# Patient Record
Sex: FEMALE | Race: WHITE
[De-identification: names, ages, dates, MRNs, and addresses within clinical notes are randomized per-mention and may not be internally consistent; named-entity substitution may affect disease eponyms.]

---

## 2017-08-29 ENCOUNTER — HOSPITAL ENCOUNTER (OUTPATIENT)
Dept: HOSPITAL 76 - DI | Age: 52
Discharge: HOME | End: 2017-08-29
Attending: PHYSICIAN ASSISTANT
Payer: COMMERCIAL

## 2017-08-29 DIAGNOSIS — Z12.31: Primary | ICD-10-CM

## 2017-08-29 DIAGNOSIS — R10.2: Primary | ICD-10-CM

## 2017-08-29 PROCEDURE — 76856 US EXAM PELVIC COMPLETE: CPT

## 2017-08-29 PROCEDURE — 76830 TRANSVAGINAL US NON-OB: CPT

## 2017-08-29 PROCEDURE — 77067 SCR MAMMO BI INCL CAD: CPT

## 2017-08-29 NOTE — ULTRASOUND REPORT
PELVIC ULTRASOUND:  08/29/2017 

 

CLINICAL INDICATION:  Right pain. 

 

TECHNIQUE:  Transabdominal pelvic ultrasound performed for global evaluation.  Transvaginal pelvic ul
trasound performed for detailed evaluation.  Real-time scanning performed and static images obtained.
 

 

The uterus is anteverted, measuring 7.5 x 5.3 x 3.3 cm.  The endometrial echo complex measures 3 mm. 
 No focal myometrial lesion is seen.  The right ovary was not confidently identified on transabdomina
l or transvaginal imaging.  No right adnexal mass is seen.  The left ovary measures 2.1 x 2.0 x 1.4 c
m, and is unremarkable.  No free fluid is present. 

 

IMPRESSION:  NONVISUALIZATION OF THE RIGHT OVARY.  NO ADNEXAL MASS.  NORMAL POSTMENOPAUSAL UTERUS AND
 LEFT OVARY. 

 

 

 

DD:08/29/2017 12:26:40  DT: 08/29/2017 17:09  JOB #: [id number]  EXT JOB #:[id number]

## 2017-08-30 NOTE — MAMMOGRAPHY REPORT
DIGITAL SCREENING MAMMOGRAM: 08/29/2017

 

CLINICAL INDICATION: A 52-year-old with family history of breast cancer, for screening. 

 

COMPARISON: 08/2014. 

 

TECHNIQUE:  Routine CC and MLO projections were obtained of the breasts.  

 

FINDINGS:  Parenchymal tissue within the breasts is predominantly fatty replaced. There are no domina
nt masses, suspicious microcalcifications, or secondary signs of malignancy. In comparison to the pre
vious studies, there are no significant changes.

 

IMPRESSION:  NO MAMMOGRAPHIC EVIDENCE OF MALIGNANCY. NO SIGNIFICANT INTERVAL CHANGES. 

 

RECOMMENDATION:  Screening mammography is recommended annually. 

 

BIRADS category 1 - negative.

 

STANDARD QUALIFYING STATEMENTS

1. This examination was reviewed with the aid of Computed-Aided Detection (CAD).

2. A negative or benign imaging report should not delay biopsy if clinically suspicious findings are 
present. Consider surgical consultation if warranted. More than 5% of cancers are not identified by i
maging.

3. Dense breasts may obscure an underlying neoplasm.

 

 

 

DD:08/30/2017 13:46:03  DT: 08/30/2017 14:32  JOB #: [id number]  EXT JOB #:[id number]

## 2018-07-08 ENCOUNTER — HOSPITAL ENCOUNTER (EMERGENCY)
Dept: HOSPITAL 76 - ED | Age: 53
Discharge: HOME | End: 2018-07-08
Payer: COMMERCIAL

## 2018-07-08 VITALS — DIASTOLIC BLOOD PRESSURE: 91 MMHG | SYSTOLIC BLOOD PRESSURE: 147 MMHG

## 2018-07-08 DIAGNOSIS — K04.7: Primary | ICD-10-CM

## 2018-07-08 DIAGNOSIS — I10: ICD-10-CM

## 2018-07-08 DIAGNOSIS — E78.00: ICD-10-CM

## 2018-07-08 PROCEDURE — 99283 EMERGENCY DEPT VISIT LOW MDM: CPT

## 2018-07-08 NOTE — ED PHYSICIAN DOCUMENTATION
PD HPI HEENT





- Stated complaint


Stated Complaint: TOOTH PAIN





- Chief complaint


Chief Complaint: Heent





- History obtained from


History obtained from: Patient





- History of Present Illness


Timing - onset: How many days ago (2)


Timing - duration: Days (2)


Timing - details: Gradual onset


Location: Tooth (right lower cap on tooth felt loose and started to hurt 

yesterday. Some swelling at base of gum today.)


Worsens: Swalllowing, Temperatures


Associated symptoms: Facial swelling (mild at gum area right lower.).  No: Fever

, Congestion, Rhinorrhea


Similar symptoms before: Has not had sx before


Recently seen: Not recently seen





Review of Systems


Constitutional: denies: Fever


Nose: denies: Rhinorrhea / runny nose, Congestion


Throat: reports: Dental pain / toothache.  denies: Oral lesions / sores, Sore 

throat, Swollen tonsils


Respiratory: denies: Cough


GI: denies: Nausea, Vomiting


Skin: denies: Rash





PD PAST MEDICAL HISTORY





- Past Medical History


Cardiovascular: Hypertension, High cholesterol


Respiratory: None


Endocrine/Autoimmune: None


GI: None


: None


HEENT: None


Psych: None


Musculoskeletal: None


Derm: None





- Past Surgical History


Past Surgical History: Yes


/GYN:  section, LEEP (Cervical surgery)


HEENT: Tonsil/Adenoidectomy





- Present Medications


Home Medications: 


 Ambulatory Orders











 Medication  Instructions  Recorded  Confirmed


 


Amlodipine Besylate [Norvasc] 5 mg PO DAILY 14


 


Lisinopril 20 mg PO DAILY 14


 


Cephalexin [Keflex] 500 mg PO TID #21 capsule 18 














- Allergies


Allergies/Adverse Reactions: 


 Allergies











Allergy/AdvReac Type Severity Reaction Status Date / Time


 


No Known Drug Allergies Allergy   Verified 18 12:48














- Social History


Does the pt smoke?: No


Smoking Status: Never smoker


Does the pt have substance abuse?: No





- POLST


Patient has POLST: No





PD ED PE NORMAL





- Vitals


Vital signs reviewed: Yes





- General


General: Alert and oriented X 3, No acute distress, Well developed/nourished





- HEENT


HEENT: Pharynx benign.  No: Dentition benign (right 2nd molar with cap and 

tenderness to percussion of it, and some swelling mildly of gum in that area. )





- Neck


Neck: Supple, no meningeal sign, No adenopathy





- Cardiac


Cardiac: RRR, No murmur





- Respiratory


Respiratory: Clear bilaterally





- Derm


Derm: Normal color, Warm and dry, No rash





Results





- Vitals


Vitals: 


 Oxygen











O2 Source                      Room air

















PD MEDICAL DECISION MAKING





- ED course


Complexity details: considered differential, d/w patient





- Sepsis Event


Vital Signs: 


 Oxygen











O2 Source                      Room air

















Departure





- Departure


Disposition: 01 Home, Self Care


Clinical Impression: 


 Dental infection





Condition: Stable


Record reviewed to determine appropriate education?: Yes


Instructions:  ED Abscess Dental


Follow-Up: 


Celia Mccarty MD [Primary Care Provider] - 


Prescriptions: 


Cephalexin [Keflex] 500 mg PO TID #21 capsule


Comments: 


Drink lots of fluids.  Anti-inflammatories such as ibuprofen or naproxen 2-3 

times a day for the next few days.  Cephalexin 3 times a day for the next week 

for the infection.  Add Tylenol if needed for pain.  Follow-up with dentist at 

soonest availability.


Discharge Date/Time: 18 14:44

## 2021-04-24 ENCOUNTER — HOSPITAL ENCOUNTER (OUTPATIENT)
Dept: HOSPITAL 76 - LAB | Age: 56
Discharge: HOME | End: 2021-04-24
Attending: NURSE PRACTITIONER
Payer: COMMERCIAL

## 2021-04-24 DIAGNOSIS — I10: Primary | ICD-10-CM

## 2021-04-24 DIAGNOSIS — E78.5: ICD-10-CM

## 2021-04-24 LAB
ALBUMIN DIAFP-MCNC: 4.4 G/DL (ref 3.2–5.5)
ALBUMIN/GLOB SERPL: 1.5 {RATIO} (ref 1–2.2)
ALP SERPL-CCNC: 48 IU/L (ref 42–121)
ALT SERPL W P-5'-P-CCNC: 16 IU/L (ref 10–60)
ANION GAP SERPL CALCULATED.4IONS-SCNC: 10 MMOL/L (ref 6–13)
AST SERPL W P-5'-P-CCNC: 18 IU/L (ref 10–42)
BASOPHILS NFR BLD AUTO: 0.1 10^3/UL (ref 0–0.1)
BASOPHILS NFR BLD AUTO: 0.7 %
BILIRUB BLD-MCNC: 0.6 MG/DL (ref 0.2–1)
BUN SERPL-MCNC: 17 MG/DL (ref 6–20)
CALCIUM UR-MCNC: 9.6 MG/DL (ref 8.5–10.3)
CHLORIDE SERPL-SCNC: 107 MMOL/L (ref 101–111)
CHOLEST SERPL-MCNC: 180 MG/DL
CO2 SERPL-SCNC: 25 MMOL/L (ref 21–32)
CREAT SERPLBLD-SCNC: 0.7 MG/DL (ref 0.4–1)
EOSINOPHIL # BLD AUTO: 0.5 10^3/UL (ref 0–0.7)
EOSINOPHIL NFR BLD AUTO: 5.1 %
ERYTHROCYTE [DISTWIDTH] IN BLOOD BY AUTOMATED COUNT: 14.1 % (ref 12–15)
GFRSERPLBLD MDRD-ARVRAT: 87 ML/MIN/{1.73_M2} (ref 89–?)
GLOBULIN SER-MCNC: 3 G/DL (ref 2.1–4.2)
GLUCOSE SERPL-MCNC: 118 MG/DL (ref 70–100)
HCT VFR BLD AUTO: 41.9 % (ref 37–47)
HDLC SERPL-MCNC: 41 MG/DL
HDLC SERPL: 4.4 {RATIO} (ref ?–4.4)
HGB UR QL STRIP: 13.6 G/DL (ref 12–16)
LDLC SERPL CALC-MCNC: 107 MG/DL
LDLC/HDLC SERPL: 2.6 {RATIO} (ref ?–4.4)
LYMPHOCYTES # SPEC AUTO: 3 10^3/UL (ref 1.5–3.5)
LYMPHOCYTES NFR BLD AUTO: 32.8 %
MCH RBC QN AUTO: 28.5 PG (ref 27–31)
MCHC RBC AUTO-ENTMCNC: 32.5 G/DL (ref 32–36)
MCV RBC AUTO: 87.8 FL (ref 81–99)
MONOCYTES # BLD AUTO: 0.6 10^3/UL (ref 0–1)
MONOCYTES NFR BLD AUTO: 6.2 %
NEUTROPHILS # BLD AUTO: 5.1 10^3/UL (ref 1.5–6.6)
NEUTROPHILS # SNV AUTO: 9.2 X10^3/UL (ref 4.8–10.8)
NEUTROPHILS NFR BLD AUTO: 54.9 %
NRBC # BLD AUTO: 0 /100WBC
NRBC # BLD AUTO: 0 X10^3/UL
PDW BLD AUTO: 10.3 FL (ref 7.9–10.8)
PLATELET # BLD: 366 10^3/UL (ref 130–450)
POTASSIUM SERPL-SCNC: 3.9 MMOL/L (ref 3.5–5)
PROT SPEC-MCNC: 7.4 G/DL (ref 6.7–8.2)
RBC MAR: 4.77 10^6/UL (ref 4.2–5.4)
SODIUM SERPLBLD-SCNC: 142 MMOL/L (ref 135–145)
TRIGL P FAST SERPL-MCNC: 161 MG/DL
TSH SERPL-ACNC: 2.25 UIU/ML (ref 0.34–5.6)
VLDLC SERPL-SCNC: 32 MG/DL

## 2021-04-24 PROCEDURE — 83721 ASSAY OF BLOOD LIPOPROTEIN: CPT

## 2021-04-24 PROCEDURE — 80061 LIPID PANEL: CPT

## 2021-04-24 PROCEDURE — 84443 ASSAY THYROID STIM HORMONE: CPT

## 2021-04-24 PROCEDURE — 80053 COMPREHEN METABOLIC PANEL: CPT

## 2021-04-24 PROCEDURE — 85025 COMPLETE CBC W/AUTO DIFF WBC: CPT

## 2021-04-24 PROCEDURE — 36415 COLL VENOUS BLD VENIPUNCTURE: CPT

## 2021-10-10 ENCOUNTER — HOSPITAL ENCOUNTER (EMERGENCY)
Dept: HOSPITAL 76 - ED | Age: 56
Discharge: ANOTHER HOSPITAL | End: 2021-10-10
Payer: COMMERCIAL

## 2021-10-10 VITALS — SYSTOLIC BLOOD PRESSURE: 127 MMHG | DIASTOLIC BLOOD PRESSURE: 85 MMHG

## 2021-10-10 DIAGNOSIS — I10: ICD-10-CM

## 2021-10-10 DIAGNOSIS — R11.0: ICD-10-CM

## 2021-10-10 DIAGNOSIS — Z20.822: ICD-10-CM

## 2021-10-10 DIAGNOSIS — I21.4: Primary | ICD-10-CM

## 2021-10-10 LAB
ALBUMIN DIAFP-MCNC: 4.6 G/DL (ref 3.2–5.5)
ALBUMIN/GLOB SERPL: 1.4 {RATIO} (ref 1–2.2)
ALP SERPL-CCNC: 52 IU/L (ref 42–121)
ALT SERPL W P-5'-P-CCNC: 37 IU/L (ref 10–60)
ANION GAP SERPL CALCULATED.4IONS-SCNC: 12 MMOL/L (ref 6–13)
AST SERPL W P-5'-P-CCNC: 17 IU/L (ref 10–42)
B PARAPERT DNA SPEC QL NAA+PROBE: NOT DETECTED
B PERT DNA SPEC QL NAA+PROBE: NOT DETECTED
BASOPHILS NFR BLD AUTO: 0.1 10^3/UL (ref 0–0.1)
BASOPHILS NFR BLD AUTO: 0.7 %
BILIRUB BLD-MCNC: 0.6 MG/DL (ref 0.2–1)
BUN SERPL-MCNC: 18 MG/DL (ref 6–20)
C PNEUM DNA NPH QL NAA+NON-PROBE: NOT DETECTED
CALCIUM UR-MCNC: 9.5 MG/DL (ref 8.5–10.3)
CHLORIDE SERPL-SCNC: 105 MMOL/L (ref 101–111)
CO2 SERPL-SCNC: 26 MMOL/L (ref 21–32)
CREAT SERPLBLD-SCNC: 1 MG/DL (ref 0.4–1)
EOSINOPHIL # BLD AUTO: 0.5 10^3/UL (ref 0–0.7)
EOSINOPHIL NFR BLD AUTO: 4.7 %
ERYTHROCYTE [DISTWIDTH] IN BLOOD BY AUTOMATED COUNT: 14.4 % (ref 12–15)
FLUAV RNA RESP QL NAA+PROBE: NOT DETECTED
GFRSERPLBLD MDRD-ARVRAT: 57 ML/MIN/{1.73_M2} (ref 89–?)
GLOBULIN SER-MCNC: 3.2 G/DL (ref 2.1–4.2)
GLUCOSE SERPL-MCNC: 147 MG/DL (ref 70–100)
HAEM INFLU B DNA SPEC QL NAA+PROBE: NOT DETECTED
HCOV 229E RNA SPEC QL NAA+PROBE: NOT DETECTED
HCOV HKU1 RNA UPPER RESP QL NAA+PROBE: NOT DETECTED
HCOV NL63 RNA ASPIRATE QL NAA+PROBE: NOT DETECTED
HCOV OC43 RNA SPEC QL NAA+PROBE: NOT DETECTED
HCT VFR BLD AUTO: 44 % (ref 37–47)
HGB UR QL STRIP: 13.7 G/DL (ref 12–16)
HMPV AG SPEC QL: NOT DETECTED
HPIV1 RNA NPH QL NAA+PROBE: NOT DETECTED
HPIV2 SPEC QL CULT: NOT DETECTED
HPIV3 AB TITR SER CF: NOT DETECTED {TITER}
HPIV4 RNA SPEC QL NAA+PROBE: NOT DETECTED
LIPASE SERPL-CCNC: 40 U/L (ref 22–51)
LYMPHOCYTES # SPEC AUTO: 3.4 10^3/UL (ref 1.5–3.5)
LYMPHOCYTES NFR BLD AUTO: 33.7 %
M PNEUMO DNA SPEC QL NAA+PROBE: NOT DETECTED
MCH RBC QN AUTO: 28.3 PG (ref 27–31)
MCHC RBC AUTO-ENTMCNC: 31.1 G/DL (ref 32–36)
MCV RBC AUTO: 90.9 FL (ref 81–99)
MONOCYTES # BLD AUTO: 0.6 10^3/UL (ref 0–1)
MONOCYTES NFR BLD AUTO: 6.2 %
NEUTROPHILS # BLD AUTO: 5.5 10^3/UL (ref 1.5–6.6)
NEUTROPHILS # SNV AUTO: 10.1 X10^3/UL (ref 4.8–10.8)
NEUTROPHILS NFR BLD AUTO: 54.5 %
NRBC # BLD AUTO: 0 /100WBC
NRBC # BLD AUTO: 0 X10^3/UL
PDW BLD AUTO: 10.7 FL (ref 7.9–10.8)
PLATELET # BLD: 250 10^3/UL (ref 130–450)
POTASSIUM SERPL-SCNC: 3.4 MMOL/L (ref 3.5–5)
PROT SPEC-MCNC: 7.8 G/DL (ref 6.7–8.2)
RBC MAR: 4.84 10^6/UL (ref 4.2–5.4)
RSV RNA RESP QL NAA+PROBE: NOT DETECTED
RV+EV RNA SPEC QL NAA+PROBE: NOT DETECTED
SARS-COV-2 RNA PNL SPEC NAA+PROBE: NOT DETECTED
SODIUM SERPLBLD-SCNC: 143 MMOL/L (ref 135–145)

## 2021-10-10 PROCEDURE — 80053 COMPREHEN METABOLIC PANEL: CPT

## 2021-10-10 PROCEDURE — 96365 THER/PROPH/DIAG IV INF INIT: CPT

## 2021-10-10 PROCEDURE — 36415 COLL VENOUS BLD VENIPUNCTURE: CPT

## 2021-10-10 PROCEDURE — 71045 X-RAY EXAM CHEST 1 VIEW: CPT

## 2021-10-10 PROCEDURE — 0202U NFCT DS 22 TRGT SARS-COV-2: CPT

## 2021-10-10 PROCEDURE — 83690 ASSAY OF LIPASE: CPT

## 2021-10-10 PROCEDURE — 99291 CRITICAL CARE FIRST HOUR: CPT

## 2021-10-10 PROCEDURE — 71275 CT ANGIOGRAPHY CHEST: CPT

## 2021-10-10 PROCEDURE — 93005 ELECTROCARDIOGRAM TRACING: CPT

## 2021-10-10 PROCEDURE — 84484 ASSAY OF TROPONIN QUANT: CPT

## 2021-10-10 PROCEDURE — 96375 TX/PRO/DX INJ NEW DRUG ADDON: CPT

## 2021-10-10 PROCEDURE — 85025 COMPLETE CBC W/AUTO DIFF WBC: CPT

## 2021-10-10 RX ADMIN — NITROGLYCERIN STA MG: 0.4 TABLET, ORALLY DISINTEGRATING SUBLINGUAL at 08:52

## 2021-10-10 RX ADMIN — NITROGLYCERIN STA MG: 0.4 TABLET, ORALLY DISINTEGRATING SUBLINGUAL at 09:01

## 2021-10-10 RX ADMIN — NITROGLYCERIN STA MG: 0.4 TABLET, ORALLY DISINTEGRATING SUBLINGUAL at 09:11

## 2021-10-10 RX ADMIN — NITROGLYCERIN STA MG: 0.4 TABLET, ORALLY DISINTEGRATING SUBLINGUAL at 14:21

## 2021-10-10 NOTE — XRAY REPORT
PROCEDURE:  Chest 1 View X-Ray

 

INDICATIONS:  Chest Pain

 

TECHNIQUE:  One view of the chest was acquired.  

 

COMPARISON:  7/6/2015

 

FINDINGS:  

 

Surgical changes and devices:  None.  

 

Lungs and pleura:  An incomplete inspiratory result is noted, with low lung volumes and crowding of t
he vascular markings. No focal infiltrates are seen. No large pneumothorax or large pleural effusion 
can be seen.

 

Mediastinum:  Mediastinal contours appear normal.  Heart size is normal.  

 

Bones and chest wall:  No suspicious bony lesions.  Overlying soft tissues appear unremarkable.  

 

 

 

IMPRESSION:  

 

Portable chest within normal limits for age.  

 

Reviewed by: Christian Bell MD on 10/10/2021 8:11 AM LYNNE

Approved by: Christian Bell MD on 10/10/2021 8:11 AM LYNNE

 

 

Station ID:  [id number]

## 2021-10-10 NOTE — CT REPORT
PROCEDURE:  ANGIO CHEST W/WO

 

INDICATIONS:  abrupt chest/back pain 30 min PTA

 

CONTRAST:  IV CONTRAST: Optiray 320 ml: 80 PO CONTRAST: *NO PO CONTRAST 

 

TECHNIQUE:  

After the administration of intravenous contrast, 2 mm axial images were acquired from the pulmonary 
apices to the posterior costophrenic angles during the arterial phase. In addition, 1 mm lung kernel 
and 5 mm soft tissue kernel reconstructions were performed. 3-dimensional coronal oblique maximum int
ensity projection (MIP) reformats, 8 mm axial MIP, and 5 mm coronal and sagittal MPR reformats were t
hen performed through the thorax. For radiation dose reduction, the following was used: automated exp
osure control, adjustment of mA and/or kV according to patient size.

 

 

COMPARISON:  Chest x-ray  10/10/2021

 

FINDINGS:  

Image quality:  Excellent.  

 

Pulmonary arteries:  Pulmonary arteries are normal in size, and demonstrate no intraluminal filling d
efects to suggest central pulmonary embolism.  

 

Lungs and pleura:  Lungs are clear.  No pleural effusions or pneumothorax.  Central and peripheral ai
rways are patent.  

dated

Mediastinum:  Heart size is normal, without pericardial effusion.  No mediastinal or hilar adenopathy
.  Thoracic aorta is normal in caliber and enhancement.  Esophagus is normal in caliber, without hiat
al hernia.  

 

Bones and chest wall:  No suspicious bony lesions.  Ribs and thoracic spine appear intact throughout.
  No axillary or supraclavicular adenopathy.  The thyroid is normal in size and there are no incident
al findings.

 

Abdomen:  Visualized upper abdominal solid organs appear normal in the early arterial phase of enhanc
ement.  

 

IMPRESSION:  

 

1. No acute process. 

2. No pulmonary embolus.

 

Reviewed by: Yessica Gotti MD on 10/10/2021 10:50 AM PDT

Approved by: Yessica Gotti MD on 10/10/2021 10:50 AM PDT

 

 

Station ID:  [id number]

## 2021-10-10 NOTE — ED PHYSICIAN DOCUMENTATION
PD HPI CHEST PAIN





- Stated complaint


Stated Complaint: UPPER BACK PX





- Chief complaint


Chief Complaint: Back Pain





- History obtained from


History obtained from: Patient





- History of Present Illness


Timing - onset: How many minutes ago (30), Today


Timing - onset during: Light activity (was just up into bathroom, with normal 

light morning activity, and had abrupt onset of upper thoracic sharp pain 

radiating to chest, associated with nausea, sweaty, lightheaded.)


Timing - details: Abrupt onset, Still present


Quality: Aching, Sharp, Pain


Location: Substernal, Upper back


Radiation: Back


Improved by: No: Rest


Worsened by: No: Inspiration, Movement, Position


Associated symptoms: Nausea, Feeling faint / dizzy, General Weakness.  No: 

Shortness of air, Vomiting, Cough


Similar symptoms before: Has not had sx before


Recently seen: Not recently seen





Review of Systems


Constitutional: denies: Fever, Chills, Myalgias


Nose: denies: Rhinorrhea / runny nose, Congestion


Throat: denies: Sore throat


Cardiac: reports: Chest pain / pressure (just PTA).  denies: Pedal edema, Calf 

pain


Respiratory: denies: Cough


GI: reports: Nausea.  denies: Abdominal Pain, Vomiting, Diarrhea


Skin: denies: Rash, Lesions


Neurologic: reports: Generalized weakness.  denies: Near syncope, Altered mental

status, Headache





PD PAST MEDICAL HISTORY





- Past Medical History


Cardiovascular: Hypertension, High cholesterol


Respiratory: None


Endocrine/Autoimmune: None


GI: None


: None


HEENT: None


Psych: None


Musculoskeletal: None


Derm: None





- Past Surgical History


Past Surgical History: Yes


/GYN:  section, LEEP (Cervical surgery)


HEENT: Tonsil/Adenoidectomy





- Present Medications


Home Medications: 


                                Ambulatory Orders











 Medication  Instructions  Recorded  Confirmed


 


Amlodipine Besylate [Norvasc] 5 mg PO DAILY 12/30/14 10/10/21


 


lisinopriL [Lisinopril] 20 mg PO DAILY 12/30/14 10/10/21


 


Potassium Chloride [Klor-Con] 20 meq PO DAILY 10/10/21 10/10/21














- Allergies


Allergies/Adverse Reactions: 


                                    Allergies











Allergy/AdvReac Type Severity Reaction Status Date / Time


 


No Known Drug Allergies Allergy   Verified 10/10/21 08:28














- Social History


Does the pt smoke?: No


Smoking Status: Never smoker


Does the pt have substance abuse?: No





- POLST


Patient has POLST: No





PD ED PE NORMAL





- Vitals


Vital signs reviewed: Yes





- General


General: Alert and oriented X 3, Well developed/nourished, Other (diaphoretic 

and pale on presentation.)





- HEENT


HEENT: Pharynx benign





- Neck


Neck: Supple, no meningeal sign, No adenopathy, No JVD





- Cardiac


Cardiac: RRR, No murmur





- Respiratory


Respiratory: Clear bilaterally





- Abdomen


Abdomen: Soft, Non tender





- Back


Back: No CVA TTP, No spinal TTP





- Derm


Derm: No: Normal color, Warm and dry





- Extremities


Extremities: No tenderness to palpate, Normal ROM s pain, No edema, No calf 

tenderness / cord





- Neuro


Neuro: Alert and oriented X 3, No motor deficit, Normal speech


Eye Opening: Spontaneous


Motor: Obeys Commands


Verbal: Oriented


GCS Score: 15





- Psych


Psych: Normal mood, Normal affect





Results





- Vitals


Vitals: 


                               Vital Signs - 24 hr











  10/10/21 10/10/21 10/10/21





  08:29 08:57 09:00


 


Temperature 36.5 C  


 


Heart Rate 60 66 64


 


Respiratory 24 18 14





Rate   


 


Blood Pressure 149/104 H 160/100 H 133/105 H


 


O2 Saturation 99 95 98














  10/10/21 10/10/21 10/10/21





  09:02 09:08 09:12


 


Temperature   


 


Heart Rate 64 70 67


 


Respiratory 12 16 12





Rate   


 


Blood Pressure 125/100 H 136/81 H 138/81 H


 


O2 Saturation 96 94 98














  10/10/21 10/10/21 10/10/21





  09:17 09:30 10:05


 


Temperature   


 


Heart Rate 66 59 L 56 L


 


Respiratory 11 L 15 15





Rate   


 


Blood Pressure 138/70 H 127/85 H 135/82 H


 


O2 Saturation 98 100 100














  10/10/21 10/10/21 10/10/21





  10:46 11:25 11:30


 


Temperature   


 


Heart Rate 65 55 L 76


 


Respiratory 14 14 20





Rate   


 


Blood Pressure 133/68 H 129/76 134/66 H


 


O2 Saturation 99 100 97














  10/10/21 10/10/21 10/10/21





  12:13 12:59 13:03


 


Temperature   


 


Heart Rate 69 54 L 56 L


 


Respiratory 16 16 16





Rate   


 


Blood Pressure 149/76 H 143/80 H 141/75 H


 


O2 Saturation 100 99 99














  10/10/21 10/10/21 10/10/21





  13:49 14:21 14:27


 


Temperature 37.1 C  


 


Heart Rate 61 58 L 69


 


Respiratory 14 14 15





Rate   


 


Blood Pressure 154/98 H 138/88 H 135/75 H


 


O2 Saturation 100 100 99














  10/10/21 10/10/21 10/10/21





  14:46 15:16 15:32


 


Temperature   


 


Heart Rate 68 72 62


 


Respiratory 18 16 13





Rate   


 


Blood Pressure 114/70 157/91 H 131/80 H


 


O2 Saturation 98 96 97














  10/10/21 10/10/21 10/10/21





  15:39 15:42 15:47


 


Temperature   


 


Heart Rate 58 L 58 L 55 L


 


Respiratory 14  





Rate   


 


Blood Pressure 136/97 H 134/85 H 131/80 H


 


O2 Saturation 95 97 














  10/10/21 10/10/21 10/10/21





  15:50 15:52 15:55


 


Temperature   


 


Heart Rate 62  59 L


 


Respiratory   





Rate   


 


Blood Pressure  121/83 H 132/78 H


 


O2 Saturation   95














  10/10/21





  16:00


 


Temperature 


 


Heart Rate 60


 


Respiratory 





Rate 


 


Blood Pressure 127/85 H


 


O2 Saturation 98








                                     Oxygen











O2 Source                      Nasal cannula

















- EKG (time done)


  ** 08:33


Rate: Rate (enter#) (66)


Rhythm: NSR


Axis: Normal


Intervals: Normal RI


QRS: Normal


Ischemia: Normal ST segments.  No: ST elevation c/w ischemia (nondiagnostic hint

of elevation/prominent T waves I, lateral. ), ST depression





- Labs


Labs: 


                                Laboratory Tests











  10/10/21 10/10/21 10/10/21





  09:01 09:01 09:01


 


WBC  10.1  


 


RBC  4.84  


 


Hgb  13.7  


 


Hct  44.0  


 


MCV  90.9  


 


MCH  28.3  


 


MCHC  31.1 L  


 


RDW  14.4  


 


Plt Count  250  


 


MPV  10.7  


 


Neut # (Auto)  5.5  


 


Lymph # (Auto)  3.4  


 


Mono # (Auto)  0.6  


 


Eos # (Auto)  0.5  


 


Baso # (Auto)  0.1  


 


Absolute Nucleated RBC  0.00  


 


Nucleated RBC %  0.0  


 


Sodium   143 


 


Potassium   3.4 L 


 


Chloride   105 


 


Carbon Dioxide   26 


 


Anion Gap   12.0 


 


BUN   18 


 


Creatinine   1.0 


 


Estimated GFR (MDRD)   57 L 


 


Glucose   147 H 


 


Calcium   9.5 


 


Total Bilirubin   0.6 


 


AST   17 


 


ALT   37 


 


Alkaline Phosphatase   52 


 


Troponin I High Sens    23.6 H*


 


Total Protein   7.8 


 


Albumin   4.6 


 


Globulin   3.2 


 


Albumin/Globulin Ratio   1.4 


 


Lipase   40 


 


Nasal Adenovirus (PCR)   


 


Nasal B. parapertussis DNA (PCR)   


 


Nasal Coronavir 229E PCR   


 


Nasal Coronavir HKU1 PCR   


 


Nasal Coronavir NL63 PCR   


 


Nasal Coronavir OC43 PCR   


 


Nasal Enterovir/Rhinovir PCR   


 


Nasal Influenza B PCR   


 


Nasal Influenza A PCR   


 


Nasal Parainfluen 1 PCR   


 


Nasal Parainfluen 2 PCR   


 


Nasal Parainfluen 3 PCR   


 


Nasal Parainfluen 4 PCR   


 


Nasal RSV (PCR)   


 


Nasal B.pertussis DNA PCR   


 


Nasal C.pneumoniae (PCR)   


 


Hernan Human Metapneumo PCR   


 


Nasal M.pneumoniae (PCR)   


 


Nasal SARS-CoV-2 (PCR)   














  10/10/21 10/10/21 10/10/21





  11:03 13:38 14:57


 


WBC   


 


RBC   


 


Hgb   


 


Hct   


 


MCV   


 


MCH   


 


MCHC   


 


RDW   


 


Plt Count   


 


MPV   


 


Neut # (Auto)   


 


Lymph # (Auto)   


 


Mono # (Auto)   


 


Eos # (Auto)   


 


Baso # (Auto)   


 


Absolute Nucleated RBC   


 


Nucleated RBC %   


 


Sodium   


 


Potassium   


 


Chloride   


 


Carbon Dioxide   


 


Anion Gap   


 


BUN   


 


Creatinine   


 


Estimated GFR (MDRD)   


 


Glucose   


 


Calcium   


 


Total Bilirubin   


 


AST   


 


ALT   


 


Alkaline Phosphatase   


 


Troponin I High Sens  631.2 H*   1823.1 H*


 


Total Protein   


 


Albumin   


 


Globulin   


 


Albumin/Globulin Ratio   


 


Lipase   


 


Nasal Adenovirus (PCR)   NOT DETECTED 


 


Nasal B. parapertussis DNA (PCR)   NOT DETECTED 


 


Nasal Coronavir 229E PCR   NOT DETECTED 


 


Nasal Coronavir HKU1 PCR   NOT DETECTED 


 


Nasal Coronavir NL63 PCR   NOT DETECTED 


 


Nasal Coronavir OC43 PCR   NOT DETECTED 


 


Nasal Enterovir/Rhinovir PCR   NOT DETECTED 


 


Nasal Influenza B PCR   NOT DETECTED 


 


Nasal Influenza A PCR   NOT DETECTED 


 


Nasal Parainfluen 1 PCR   NOT DETECTED 


 


Nasal Parainfluen 2 PCR   NOT DETECTED 


 


Nasal Parainfluen 3 PCR   NOT DETECTED 


 


Nasal Parainfluen 4 PCR   NOT DETECTED 


 


Nasal RSV (PCR)   NOT DETECTED 


 


Nasal B.pertussis DNA PCR   NOT DETECTED 


 


Nasal C.pneumoniae (PCR)   NOT DETECTED 


 


Hernan Human Metapneumo PCR   NOT DETECTED 


 


Nasal M.pneumoniae (PCR)   NOT DETECTED 


 


Nasal SARS-CoV-2 (PCR)   NOT DETECTED 














- Rads (name of study)


  ** chest xray


Radiology: Prelim report reviewed (no acute process), See rad report





  ** chest angio


Radiology: Prelim report reviewed (no PE nor aortic process; lungs clear), See 

rad report





PD MEDICAL DECISION MAKING





- ED course


Complexity details: reviewed results (repeat Trop showing significant increase 

c/w NSTEMI. ), re-evaluated patient (she is improved well with NTG x 3 and 

Morphine. Color improved. ), considered differential (Abrupt onset of 

significant pain in the back into the chest associated with nausea diaphoresis 

and pallor.  Clinically would be very suspicious suspiciously concerning for 

acute process such as MI, clots, dissection, lung process.), d/w patient


ED course: 





The patient's color is much improved and her symptoms have decreased 

significantly with nitro x3 and morphine.  Repeat EKG shows some T wave 

flattening but no acute ischemic changes per se.  CT of the chest was done for 

concern of aortic or vascular process and this was normal.  Repeat troponin at 2

hours showed a significant increase.  Her symptoms and testing are indicative of

non-ST elevation MI.  I feel she needs more appropriate evaluation and testing 

with cardiology and will look at transfer to a different facility.





At this point we are looking into beds available at other facilities and 

unfortunately taking a little bit of a protracted time for space available.  

Calling various hospitals.








The patient has had recurrent chest pains while here in the ER.  Some was 

improved with nitroglycerin again and morphine.  However she had another sub

sequent episode with diaphoresis.  She was placed on a nitro drip with titrating

upward.  Also IV morphine and ondansetron for nausea.  Concern is for her 

ongoing and recurring symptoms.  We worse finally able to find a facility that 

was able to accept her in transfer.  I talked with Dr. Nogueira cardiology at 

[facility] in [place].  He was willing to accept the patient in transfer ER

to ER for subsequent evaluation and presumed heart catheterization.  The patient

will be transferred via air flight as she is on heparin and nitroglycerin drips 

with still some symptoms and requires higher level of care and expeditious 

transfer.





- Critical Care


Time(min): 50


Time Includes: Direct patient care, Reassess patient, Document care, Coordinate 

care


Data interpretation: Labs, CXR


Procedures excluded from critical care time: EKG





Departure





- Departure


Disposition: 02 Transfer Acute Care Hosp


Clinical Impression: 


 Acute chest pain, NSTEMI (non-ST elevated myocardial infarction)





Condition: Stable


Record reviewed to determine appropriate education?: Yes

## 2021-10-15 ENCOUNTER — HOSPITAL ENCOUNTER (OUTPATIENT)
Dept: HOSPITAL 76 - ED | Age: 56
Setting detail: OBSERVATION
LOS: 1 days | Discharge: HOME | End: 2021-10-16
Attending: SPECIALIST | Admitting: NURSE PRACTITIONER
Payer: COMMERCIAL

## 2021-10-15 DIAGNOSIS — E78.5: ICD-10-CM

## 2021-10-15 DIAGNOSIS — R77.8: ICD-10-CM

## 2021-10-15 DIAGNOSIS — E87.6: ICD-10-CM

## 2021-10-15 DIAGNOSIS — I50.23: ICD-10-CM

## 2021-10-15 DIAGNOSIS — Z95.5: ICD-10-CM

## 2021-10-15 DIAGNOSIS — I25.5: ICD-10-CM

## 2021-10-15 DIAGNOSIS — Z20.822: ICD-10-CM

## 2021-10-15 DIAGNOSIS — I11.0: Primary | ICD-10-CM

## 2021-10-15 DIAGNOSIS — F41.9: ICD-10-CM

## 2021-10-15 LAB
ALBUMIN DIAFP-MCNC: 3.3 G/DL (ref 3.2–5.5)
ALBUMIN/GLOB SERPL: 0.9 {RATIO} (ref 1–2.2)
ALP SERPL-CCNC: 62 IU/L (ref 42–121)
ALT SERPL W P-5'-P-CCNC: 64 IU/L (ref 10–60)
ANION GAP SERPL CALCULATED.4IONS-SCNC: 13 MMOL/L (ref 6–13)
AST SERPL W P-5'-P-CCNC: 43 IU/L (ref 10–42)
B PARAPERT DNA SPEC QL NAA+PROBE: NOT DETECTED
B PERT DNA SPEC QL NAA+PROBE: NOT DETECTED
BASOPHILS NFR BLD AUTO: 0.1 10^3/UL (ref 0–0.1)
BASOPHILS NFR BLD AUTO: 0.5 %
BILIRUB BLD-MCNC: 0.7 MG/DL (ref 0.2–1)
BUN SERPL-MCNC: 16 MG/DL (ref 6–20)
C PNEUM DNA NPH QL NAA+NON-PROBE: NOT DETECTED
CALCIUM UR-MCNC: 8.4 MG/DL (ref 8.5–10.3)
CHLORIDE SERPL-SCNC: 102 MMOL/L (ref 101–111)
CO2 SERPL-SCNC: 27 MMOL/L (ref 21–32)
CREAT SERPLBLD-SCNC: 0.7 MG/DL (ref 0.4–1)
EOSINOPHIL # BLD AUTO: 0.3 10^3/UL (ref 0–0.7)
EOSINOPHIL NFR BLD AUTO: 2.6 %
ERYTHROCYTE [DISTWIDTH] IN BLOOD BY AUTOMATED COUNT: 14.8 % (ref 12–15)
FLUAV RNA RESP QL NAA+PROBE: NOT DETECTED
GFRSERPLBLD MDRD-ARVRAT: 86 ML/MIN/{1.73_M2} (ref 89–?)
GLOBULIN SER-MCNC: 3.8 G/DL (ref 2.1–4.2)
GLUCOSE SERPL-MCNC: 111 MG/DL (ref 70–100)
HAEM INFLU B DNA SPEC QL NAA+PROBE: NOT DETECTED
HCOV 229E RNA SPEC QL NAA+PROBE: NOT DETECTED
HCOV HKU1 RNA UPPER RESP QL NAA+PROBE: NOT DETECTED
HCOV NL63 RNA ASPIRATE QL NAA+PROBE: NOT DETECTED
HCOV OC43 RNA SPEC QL NAA+PROBE: NOT DETECTED
HCT VFR BLD AUTO: 32.6 % (ref 37–47)
HGB UR QL STRIP: 10.3 G/DL (ref 12–16)
HMPV AG SPEC QL: NOT DETECTED
HPIV1 RNA NPH QL NAA+PROBE: NOT DETECTED
HPIV2 SPEC QL CULT: NOT DETECTED
HPIV3 AB TITR SER CF: NOT DETECTED {TITER}
HPIV4 RNA SPEC QL NAA+PROBE: NOT DETECTED
LIPASE SERPL-CCNC: 58 U/L (ref 22–51)
LYMPHOCYTES # SPEC AUTO: 1.9 10^3/UL (ref 1.5–3.5)
LYMPHOCYTES NFR BLD AUTO: 14.9 %
M PNEUMO DNA SPEC QL NAA+PROBE: NOT DETECTED
MCH RBC QN AUTO: 28.2 PG (ref 27–31)
MCHC RBC AUTO-ENTMCNC: 31.6 G/DL (ref 32–36)
MCV RBC AUTO: 89.3 FL (ref 81–99)
MONOCYTES # BLD AUTO: 1.5 10^3/UL (ref 0–1)
MONOCYTES NFR BLD AUTO: 11.8 %
NEUTROPHILS # BLD AUTO: 8.8 10^3/UL (ref 1.5–6.6)
NEUTROPHILS # SNV AUTO: 12.7 X10^3/UL (ref 4.8–10.8)
NEUTROPHILS NFR BLD AUTO: 69.4 %
NRBC # BLD AUTO: 0 /100WBC
NRBC # BLD AUTO: 0 X10^3/UL
PDW BLD AUTO: 10.7 FL (ref 7.9–10.8)
PLATELET # BLD: 410 10^3/UL (ref 130–450)
POTASSIUM SERPL-SCNC: 3.6 MMOL/L (ref 3.5–5)
PROT SPEC-MCNC: 7.1 G/DL (ref 6.7–8.2)
RBC MAR: 3.65 10^6/UL (ref 4.2–5.4)
RSV RNA RESP QL NAA+PROBE: NOT DETECTED
RV+EV RNA SPEC QL NAA+PROBE: NOT DETECTED
SARS-COV-2 RNA PNL SPEC NAA+PROBE: NOT DETECTED
SODIUM SERPLBLD-SCNC: 142 MMOL/L (ref 135–145)

## 2021-10-15 PROCEDURE — 36415 COLL VENOUS BLD VENIPUNCTURE: CPT

## 2021-10-15 PROCEDURE — 83690 ASSAY OF LIPASE: CPT

## 2021-10-15 PROCEDURE — 96374 THER/PROPH/DIAG INJ IV PUSH: CPT

## 2021-10-15 PROCEDURE — 93005 ELECTROCARDIOGRAM TRACING: CPT

## 2021-10-15 PROCEDURE — 85025 COMPLETE CBC W/AUTO DIFF WBC: CPT

## 2021-10-15 PROCEDURE — 93306 TTE W/DOPPLER COMPLETE: CPT

## 2021-10-15 PROCEDURE — 99285 EMERGENCY DEPT VISIT HI MDM: CPT

## 2021-10-15 PROCEDURE — 96376 TX/PRO/DX INJ SAME DRUG ADON: CPT

## 2021-10-15 PROCEDURE — 71045 X-RAY EXAM CHEST 1 VIEW: CPT

## 2021-10-15 PROCEDURE — 0202U NFCT DS 22 TRGT SARS-COV-2: CPT

## 2021-10-15 PROCEDURE — 80053 COMPREHEN METABOLIC PANEL: CPT

## 2021-10-15 PROCEDURE — 80048 BASIC METABOLIC PNL TOTAL CA: CPT

## 2021-10-15 PROCEDURE — 85379 FIBRIN DEGRADATION QUANT: CPT

## 2021-10-15 PROCEDURE — 84484 ASSAY OF TROPONIN QUANT: CPT

## 2021-10-15 PROCEDURE — 83880 ASSAY OF NATRIURETIC PEPTIDE: CPT

## 2021-10-15 RX ADMIN — SODIUM CHLORIDE, PRESERVATIVE FREE SCH ML: 5 INJECTION INTRAVENOUS at 18:32

## 2021-10-15 RX ADMIN — METOPROLOL SUCCINATE SCH MG: 50 TABLET, EXTENDED RELEASE ORAL at 20:43

## 2021-10-15 RX ADMIN — DILTIAZEM HYDROCHLORIDE SCH EACH: 120 CAPSULE, COATED, EXTENDED RELEASE ORAL at 20:44

## 2021-10-15 NOTE — HISTORY & PHYSICAL EXAMINATION
Chief Complaint





- Chief Complaint


Chief Complaint: shortness of breath





History of Present Illness





- Admitted From


Admitted From:: medical floor





- History Obtained From


Records Reviewed: [facility]


History obtained from: pt


Exam Limitations: no





- History of Present Illness


HPI Comment/Other: 


This is a 56-year-old woman with A past medical history significant for 

hypertension, hyperlipidemia, Heart failure, s/p of cardiac stents who comes in 

ER for evaluation of shortness of breath. Patient was discharged from 

[facility] on yesterday. Patient reported she had 3 cardiac stents 

at there. she had angiography access through the right wrist. She developed 

complication when had cardiac angiography. she developed immediate in-stent 

rethrombosis necessitating multiple stents. she had three stents. per pt's 

report, her echo in hospital show an EF of 45%. She report cough but denies 

fever or chill. she denies chest pain. she report she had Covid 19 vaccinate. 

Covid 19 test is negative. CXR reveals mild prominence of the central pulmonary 

Vasculature bilaterally, No acute pulmonary consultation. Laboratory tests show 

patient had over 10,000 troponin But the patient had Troponin 25,000 on 

yesterday when she was discharged from [facility]. pt's Cardiologist was called

by the ER provider. He recommends diuresis to pt and admission of pt to the 

hospital for trending of the troponins. 


Discussed the care goal with the patient, patient hope to have full code 








History





- Past Medical History


Cardiovascular: reports: Hypertension, High cholesterol


Respiratory: reports: None


Neuro: reports: None


Endocrine/Autoimmune: reports: None


GI: reports: None


GYN: reports: None


: reports: None


HEENT: reports: None


Psych: reports: None


Musculoskeletal: reports: None


Derm: reports: None


MRSA Hx?: No





- Past Surgical History


/GYN: reports:  section, LEEP (Cervical surgery)


HEENT: reports: Tonsil/Adenoidectomy





- Family & Social History


Family History: Mother: , Father: Alive and Well


Family History Comment/Other: Patient report her father is still alive, 88 years

old, With medical history of heart Bypass. Her mother  at age 70 from severe

heart attack.


Social History Notes: Patient report she never smoked, she has no alcohol or 

drug issue





- POLST


Patient has POLST: No





Meds/Allgy





- Home Medications


Home Medications: 


                                Ambulatory Orders











 Medication  Instructions  Recorded  Confirmed


 


Amlodipine Besylate [Norvasc] 10 mg PO 10/15/21 


 


Aspirin [[place] Aspirin] 81 mg PO DAILY 10/15/21 10/15/21


 


Atorvastatin Calcium [Lipitor] 80 mg PO QPM 10/15/21 10/15/21


 


Furosemide [Lasix] 20 - 40 mg PO DAILY PRN 10/15/21 10/15/21


 


Lisinopril/Hydrochlorothiazide 2 tab PO DAILY 10/15/21 10/15/21





[Zestoretic 20-12.5 mg Tablet]   


 


Losartan Potassium 25 mg PO DAILY 10/15/21 10/15/21


 


Metoprolol Succinate [Toprol Xl] 75 mg PO BID 10/15/21 10/15/21


 


Nitroglycerin [Nitrostat] 0.4 mg SL Q5MIN PRN 10/15/21 10/15/21


 


Potassium Chloride [Klor-Con M20] 20 meq PO DAILYWM 10/15/21 10/15/21


 


Ticagrelor [Brilinta] 90 mg PO BID 10/15/21 10/15/21














- Allergies


Allergies/Adverse Reactions: 


                                    Allergies











Allergy/AdvReac Type Severity Reaction Status Date / Time


 


No Known Drug Allergies Allergy   Verified 10/10/21 08:28














Review of Systems





- Constitutional


Constitutional: reports: Weakness.  denies: Fatigue, Fever, Chills





- Eyes


Eyes: denies: Pain, Field loss





- Ears, Nose & Throat


Ears, Nose & Throat: denies: Ear pain, Nosebleeds





- Cardiovascular


Cariovascular: reports: Exertional dyspnea.  denies: Palpitations, Chest pain





- Respiratory


Respiratory: reports: Cough, SOB with exertion.  denies: Sputum production, SOB 

at rest





- Gastrointestinal


Gastrointestinal: denies: Abdominal pain, Nausea, Vomiting





- Genitourinary


Genitourinary: denies: Dysuria





- Musculoskeletal


Musculoskeletal: denies: Muscle pain





- Integumentary


Integumentary: denies: Rash





- Neurological


Neurological: reports: General weakness.  denies: Focal weakness, Dizziness, 

Numbness, Pre-existing deficit, Abnormal gait, Seizures, Incoordination, Slurred

 speech





- Psychiatric


Psychiatric: denies: Depression





Exam





- Vital Signs


Vital Signs: 





                                Vital Signs x48h











  Temp Pulse Resp BP Pulse Ox


 


 10/15/21 14:16   82  27 H   97


 


 10/15/21 13:30   78  27 H  116/77  98


 


 10/15/21 13:00   80  24  118/82 H  98


 


 10/15/21 12:40   83  30 H  108/72  97


 


 10/15/21 12:01     144/129 H 


 


 10/15/21 11:43  36.9 C  94  24  123/87 H  97














- Physical Exam


General Appearance: positive: No acute distress, Alert.  negative: Lethargic


Eyes Bilateral: positive: Normal inspection, PERRL, No lid inflammation


ENT: positive: ENT inspection nml, No signs of dehydration.  negative: Purulent 

nasal drainage


Neck: positive: Nml inspection, Trachea midline.  negative: Thyromegaly, 

Tracheal deviation


Respiratory: positive: Chest non-tender, No respiratory distress, Breath sounds 

nml.  negative: Wheezes


Cardiovascular: positive: Regular rate & rhythm, No murmur.  negative: Tachy

cardia, Bradycardia, Systolic murmur, Diastolic murmur


Peripheral Pulses: positive: 2+


Abdomen: positive: Non-tender, Nml bowel sounds, No distention.  negative: 

Tenderness


Back: positive: Nml inspection


Skin: positive: Color nml, Warm, Dry.  negative: Cyanosis


Extremities: positive: Non-tender, Full ROM, Nml appearance.  negative: Calf 

tenderness


Neurologic/Psychiatric: positive: Oriented x3, Motor nml, Sensation nml.  

negative: Weakness, Sensory loss, Facial droop, Slurred/abnml speech, Depressed 

mood/affect





Conclusion/Plan





- Problem List


(1) Shortness of breath


Conclusion/Plan: 


Patient had tachypnea but with 97% oxygen saturation on room air. Chest x-ray 

show mild pulmonary vascular edema, No suggestion consultation. Patient was just

 discharged from [facility] for Angiography with cardiac stents. Patient had 

echo study show 45% of EF. D-dimer is 516. Covid 19 is negative. Her 

cardiologist suggestion Diuretic for patient. Patient's shortness of breathing 

is likely caused by her pulmonary edema and new systolic heart failure from 

Ischemic cardiomyopathy. ER already give patient 40mg intravenous Lasix, will 

continue, We will order new echo study in this hospital. Continue laboratory 

monitor, Telemetry monitor, Vital signs monitor











(3) Ischemic cardiomyopathy


Conclusion/Plan: 


New echo show patient had 40 to 45% of EF, With severe Hypokinesis of middle to 

apical anterior and lateral wall. We will continue patient home medication 

lisinopril, metoprolol, diuretics, and Lipitor, Patient may continue follow-up 

his cardiologist as outpatient. Patient may follow-up with cardiac wellness 

inpatient and outpatient








(4) Elevated troponin


Conclusion/Plan: 


Patient denies chest pain, EKG did not review echo ischemic change. Patient had 

over 10,000 troponin on today, She had 25,000 on yesterday when she was 

discharged from [facility]. pt had NSTEMI at [facility] and cardiac stents. 

Troponin enzyme was likely released from previous MI and expect it will continue

 trended down. Patient's cardiologist was consulted, he recommended to trend 

check serial of troponin and diuretics for patient. We will continue patient 

home medication aspirin, Brilinta, Metoprolol, And Lipitor. Continue telemetry 

and vital signs monitor patient.








(5) HTN (hypertension)


Conclusion/Plan: 


Patient blood pressure and clinically pt is stable, we will resume patient home 

medication lisinopril, HCTZ and metoprolol, and Lasix. 








(6) HLD (hyperlipidemia)


Conclusion/Plan: 


We will resume patient home Lipitor








- Lab Results


Fish Bones: 


                                 10/15/21 12:11





                                 10/15/21 12:11





Core Measures





- Anticipated LOS


I expect patient to be DC'd or transferred within 96 hours.: Yes





- DVT/VTE - Prophylaxis


VTE/DVT Device ordered at admit?: Yes


VTE/DVT Prophylaxis med ordered at admit?: Yes

## 2021-10-15 NOTE — XRAY REPORT
PROCEDURE:  Chest 1 View X-Ray

 

INDICATIONS:  Chest pain

 

TECHNIQUE:  One view of the chest was acquired.  

 

COMPARISON:  Chest radiograph and CTA chest dated 10/10/2021

 

FINDINGS:  

 

Surgical changes and devices:  None.  

 

Lungs and pleura:  No pleural effusions or pneumothorax. There is mild prominence of the central pulm
onary vasculature. No acute pulmonary consolidation is seen.

 

Mediastinum:  Mediastinal contours appear normal.  Heart size is normal.  

 

Bones and chest wall:  No suspicious bony lesions.  Overlying soft tissues appear unremarkable.  

 

IMPRESSION:  

Mild prominence of the central pulmonary vasculature bilaterally. No acute pulmonary consolidation is
 seen. Recommend correlation for signs of volume overload or mild heart failure.

 

Reviewed by: Manoj Dumont MD on 10/15/2021 12:27 PM PDT

Approved by: Manoj Dumont MD on 10/15/2021 12:27 PM PDT

 

 

Station ID:  [id number]

## 2021-10-15 NOTE — PHARMACY PROGRESS NOTE
- Best Possible Medication History


Admit Date and Time: 10/15/21 1400


Processed by: Pharmacy


Medication History completed: Yes


Patient Interview: Completed


Secondary Source(s): Physician records, Pharmacy records, Insurance records





As the person ultimately responsible for medication therapy, providers are able 

to order a medication from an existing home medication list in [facility] via the 

"Reconcile Routine" prior to Confirmation of that medication by support staff. 

Such practice is discouraged except when the physician, in their clinical 

judgment, deems that a medical need exists for a medication without regard to 

previous use.

## 2021-10-16 VITALS — SYSTOLIC BLOOD PRESSURE: 102 MMHG | DIASTOLIC BLOOD PRESSURE: 58 MMHG

## 2021-10-16 LAB
ANION GAP SERPL CALCULATED.4IONS-SCNC: 14 MMOL/L (ref 6–13)
BASOPHILS NFR BLD AUTO: 0.1 10^3/UL (ref 0–0.1)
BASOPHILS NFR BLD AUTO: 0.5 %
BUN SERPL-MCNC: 17 MG/DL (ref 6–20)
CALCIUM UR-MCNC: 8.6 MG/DL (ref 8.5–10.3)
CHLORIDE SERPL-SCNC: 97 MMOL/L (ref 101–111)
CO2 SERPL-SCNC: 27 MMOL/L (ref 21–32)
CREAT SERPLBLD-SCNC: 0.8 MG/DL (ref 0.4–1)
EOSINOPHIL # BLD AUTO: 0.4 10^3/UL (ref 0–0.7)
EOSINOPHIL NFR BLD AUTO: 2.5 %
ERYTHROCYTE [DISTWIDTH] IN BLOOD BY AUTOMATED COUNT: 14.6 % (ref 12–15)
GFRSERPLBLD MDRD-ARVRAT: 74 ML/MIN/{1.73_M2} (ref 89–?)
GLUCOSE SERPL-MCNC: 116 MG/DL (ref 70–100)
HCT VFR BLD AUTO: 36 % (ref 37–47)
HGB UR QL STRIP: 11.5 G/DL (ref 12–16)
LYMPHOCYTES # SPEC AUTO: 2.7 10^3/UL (ref 1.5–3.5)
LYMPHOCYTES NFR BLD AUTO: 18.5 %
MCH RBC QN AUTO: 28.5 PG (ref 27–31)
MCHC RBC AUTO-ENTMCNC: 31.9 G/DL (ref 32–36)
MCV RBC AUTO: 89.1 FL (ref 81–99)
MONOCYTES # BLD AUTO: 1.4 10^3/UL (ref 0–1)
MONOCYTES NFR BLD AUTO: 9.8 %
NEUTROPHILS # BLD AUTO: 9.9 10^3/UL (ref 1.5–6.6)
NEUTROPHILS # SNV AUTO: 14.6 X10^3/UL (ref 4.8–10.8)
NEUTROPHILS NFR BLD AUTO: 67.9 %
NRBC # BLD AUTO: 0 /100WBC
NRBC # BLD AUTO: 0 X10^3/UL
PDW BLD AUTO: 10.5 FL (ref 7.9–10.8)
PLATELET # BLD: 484 10^3/UL (ref 130–450)
POTASSIUM SERPL-SCNC: 3.4 MMOL/L (ref 3.5–5)
RBC MAR: 4.04 10^6/UL (ref 4.2–5.4)
SODIUM SERPLBLD-SCNC: 138 MMOL/L (ref 135–145)

## 2021-10-16 RX ADMIN — DILTIAZEM HYDROCHLORIDE SCH EACH: 120 CAPSULE, COATED, EXTENDED RELEASE ORAL at 10:28

## 2021-10-16 RX ADMIN — SODIUM CHLORIDE, PRESERVATIVE FREE SCH ML: 5 INJECTION INTRAVENOUS at 00:11

## 2021-10-16 RX ADMIN — SODIUM CHLORIDE, PRESERVATIVE FREE SCH ML: 5 INJECTION INTRAVENOUS at 10:29

## 2021-10-16 RX ADMIN — METOPROLOL SUCCINATE SCH MG: 50 TABLET, EXTENDED RELEASE ORAL at 10:26

## 2021-10-16 NOTE — PROVIDER PROGRESS NOTE
Subjective





- Prog Note Date


Prog Note Date: 10/16/21


Prog Note Time: 11:00





- Subjective


Pt reports feeling: Improved


Subjective: 


Discharge Note:








she has hd improved sob with her lasix here.  Feels stable.  At first the gibbons 

gave her anxiety but now that she has had 3 days to get use to it she doesn't 

feel as anxious.  Would like to go home. Denies angina, edema has gone, still w 

orthopnea that annoys her since it interrupts her sleep.  Lasix definitely helps

that. 


Ate 100% of breakfast and is walking to bathroom without assist. 





Current Medications





- Current Medications


Current Medications: 





Active Medications





Acetaminophen (Acetaminophen 325 Mg Tablet)  650 mg PO Q4HR PRN


   PRN Reason: Pain 1 to 4


Aspirin (Aspirin Chew 81 Mg Tablet)  81 mg PO DAILY [facility]


   Last Admin: 10/16/21 10:25 Dose:  81 mg


   Documented by: 


Atorvastatin Calcium (Atorvastatin 40 Mg Tablet)  80 mg PO QPM [facility]


   Last Admin: 10/15/21 20:43 Dose:  80 mg


   Documented by: 


Diphenhydramine HCl (Diphenhydramine 25 Mg Capsule)  25 mg PO QPM PRN


   PRN Reason: Insomnia


   Last Admin: 10/15/21 21:34 Dose:  25 mg


   Documented by: 


Enoxaparin Sodium (Enoxaparin 40 Mg/0.4 Ml Syringe)  40 mg SUBQ DAILY [facility]


   Last Admin: 10/16/21 10:29 Dose:  40 mg


   Documented by: 


Furosemide (Furosemide 40 Mg/4 Ml Vial)  40 mg IVP BIDDIURETIC [facility]


   Last Admin: 10/16/21 05:06 Dose:  40 mg


   Documented by: 


Losartan Potassium (Losartan 50 Mg Tablet)  25 mg PO DAILY [facility]


   Last Admin: 10/16/21 10:25 Dose:  25 mg


   Documented by: 


Metoprolol Succinate (Metoprolol Succinate 50 Mg Tablet)  75 mg PO BID [facility]


   Last Admin: 10/16/21 10:26 Dose:  75 mg


   Documented by: 


Nitroglycerin (Nitroglycerin Sl 0.4 Mg Tablet)  0.4 mg SL Q5MIN PRN


   PRN Reason: Chest Pain


Ondansetron HCl (Ondansetron Odt 4 Mg Tablet)  4 mg TL Q6HR PRN


   PRN Reason: Nausea / Vomiting


Ondansetron HCl (Ondansetron 4 Mg/2 Ml Vial)  4 mg IVP Q6HR PRN


   PRN Reason: Nausea / Vomiting


Oxycodone HCl (Oxycodone 5 Mg Tablet)  5 mg PO Q4HR PRN


   PRN Reason: Pain 5 to 7


Ticagrelor [Brilinta (] 90 Mg Tablet)  1 each PO BID [facility]


   Last Admin: 10/16/21 10:28 Dose:  1 each


   Documented by: 


Potassium Chloride (Potassium Chloride 20 Meq Tablet)  20 meq PO DAILYWM [facility]


   Last Admin: 10/16/21 10:27 Dose:  20 meq


   Documented by: 


Sodium Chloride (Sodium Chloride Flush 0.9% 10 Ml Syringe)  10 ml IVP PRN PRN


   PRN Reason: AS NEEDED PER PROVIDER ORDERS


Sodium Chloride (Sodium Chloride Flush 0.9% 10 Ml Syringe)  10 ml IVP 0100,0900,

1700 [facility]


   Last Admin: 10/16/21 10:29 Dose:  10 ml


   Documented by: 





                                        





Amlodipine Besylate [Norvasc] 10 mg PO DAILY 10/15/21 


Aspirin [Central High Aspirin] 81 mg PO DAILY 10/15/21 


Atorvastatin Calcium [Lipitor] 80 mg PO QPM 10/15/21 


Furosemide [Lasix] 20 - 40 mg PO DAILY PRN 10/15/21 


Losartan Potassium 25 mg PO DAILY 10/15/21 


Metoprolol Succinate [Toprol Xl] 75 mg PO BID 10/15/21 


Nitroglycerin [Nitrostat] 0.4 mg SL Q5MIN PRN 10/15/21 


Potassium Chloride [Klor-Con M20] 20 meq PO DAILYWM 10/15/21 


Ticagrelor [Brilinta] 90 mg PO BID 10/15/21 











Objective





- Vital Signs/Intake & Output


Reviewed Vital Signs: Yes


Vital Signs: 


                                Vital Signs x48h











  Temp Pulse Resp BP Pulse Ox


 


 10/16/21 07:51  36.7 C  85  20  109/66  96


 


 10/16/21 05:05   83   107/63 


 


 10/16/21 03:34  37 C  81  15  92/54 L  96











Intake & Output: 


                                 Intake & Output











 10/13/21 10/14/21 10/15/21 10/16/21





 23:59 23:59 23:59 23:59


 


Intake Total   790 390


 


Output Total   2200 1000


 


Balance   -1410 -610














- Objective


General Appearance: positive: No acute distress, Alert, Other (sitting up in 

bed, eating breakfast and speaking on phone. no gibbons or increased inspiratory 

effort.)


Eyes Bilateral: positive: PERRL, EOMI


ENT: positive: Pharynx nml, No signs of dehydration


Neck: positive: No JVD.  negative: Stiff neck


Respiratory: positive: No respiratory distress.  negative: Wheezes, Rales, 

Rhonchi


Cardiovascular: positive: Regular rate & rhythm, Systolic murmur.  negative: 

Gallop/S4, Friction rub


Abdomen: positive: Non-tender, No organomegaly, Nml bowel sounds, No distention


Skin: positive: Warm, Dry


Extremities: positive: Full ROM, Pedal edema (very minimal around ankles.  She 

said it's almost gone.)


Neurologic/Psychiatric: positive: Oriented x3, CN's nml (2-12), Motor nml, 

Sensation nml





- Lab Results


Fish Bones: 


                                 10/16/21 05:20





                                 10/16/21 05:20


Other Labs: 


                               Lab Results x24hrs











  10/16/21 10/16/21 10/16/21 Range/Units





  05:20 05:20 05:20 


 


WBC    14.6 H  (4.8-10.8)  x10^3/uL


 


RBC    4.04 L  (4.20-5.40)  10^6/uL


 


Hgb    11.5 L  (12.0-16.0)  g/dL


 


Hct    36.0 L  (37.0-47.0)  %


 


MCV    89.1  (81.0-99.0)  fL


 


MCH    28.5  (27.0-31.0)  pg


 


MCHC    31.9 L  (32.0-36.0)  g/dL


 


RDW    14.6  (12.0-15.0)  %


 


Plt Count    484 H  (130-450)  10^3/uL


 


MPV    10.5  (7.9-10.8)  fL


 


Neut # (Auto)    9.9 H  (1.5-6.6)  10^3/uL


 


Lymph # (Auto)    2.7  (1.5-3.5)  10^3/uL


 


Mono # (Auto)    1.4 H  (0.0-1.0)  10^3/uL


 


Eos # (Auto)    0.4  (0.0-0.7)  10^3/uL


 


Baso # (Auto)    0.1  (0.0-0.1)  10^3/uL


 


Absolute Nucleated RBC    0.00  x10^3/uL


 


Nucleated RBC %    0.0  /100WBC


 


D-Dimer     (200.0-255.0)  ng/mL


 


Sodium   138   (135-145)  mmol/L


 


Potassium   3.4 L   (3.5-5.0)  mmol/L


 


Chloride   97 L   (101-111)  mmol/L


 


Carbon Dioxide   27   (21-32)  mmol/L


 


Anion Gap   14.0 H   (6-13)  


 


BUN   17   (6-20)  mg/dL


 


Creatinine   0.8   (0.4-1.0)  mg/dL


 


Estimated GFR (MDRD)   74 L   (>89)  


 


Glucose   116 H   ()  mg/dL


 


Calcium   8.6   (8.5-10.3)  mg/dL


 


Total Bilirubin     (0.2-1.0)  mg/dL


 


AST     (10-42)  IU/L


 


ALT     (10-60)  IU/L


 


Alkaline Phosphatase     ()  IU/L


 


Troponin I High Sens     (2.3-14.8)  ng/L


 


B-Natriuretic Peptide  428 H    (5-100)  pg/mL


 


Total Protein     (6.7-8.2)  g/dL


 


Albumin     (3.2-5.5)  g/dL


 


Globulin     (2.1-4.2)  g/dL


 


Albumin/Globulin Ratio     (1.0-2.2)  


 


Lipase     (22-51)  U/L


 


Nasal Adenovirus (PCR)     


 


Nasal B. parapertussis DNA (PCR)     


 


Nasal Coronavir 229E PCR     


 


Nasal Coronavir HKU1 PCR     


 


Nasal Coronavir NL63 PCR     


 


Nasal Coronavir OC43 PCR     


 


Nasal Enterovir/Rhinovir PCR     


 


Nasal Influenza B PCR     


 


Nasal Influenza A PCR     


 


Nasal Parainfluen 1 PCR     


 


Nasal Parainfluen 2 PCR     


 


Nasal Parainfluen 3 PCR     


 


Nasal Parainfluen 4 PCR     


 


Nasal RSV (PCR)     


 


Nasal B.pertussis DNA PCR     


 


Nasal C.pneumoniae (PCR)     


 


Hernan Human Metapneumo PCR     


 


Nasal M.pneumoniae (PCR)     


 


Nasal SARS-CoV-2 (PCR)     














  10/15/21 10/15/21 10/15/21 Range/Units





  23:47 18:33 14:03 


 


WBC     (4.8-10.8)  x10^3/uL


 


RBC     (4.20-5.40)  10^6/uL


 


Hgb     (12.0-16.0)  g/dL


 


Hct     (37.0-47.0)  %


 


MCV     (81.0-99.0)  fL


 


MCH     (27.0-31.0)  pg


 


MCHC     (32.0-36.0)  g/dL


 


RDW     (12.0-15.0)  %


 


Plt Count     (130-450)  10^3/uL


 


MPV     (7.9-10.8)  fL


 


Neut # (Auto)     (1.5-6.6)  10^3/uL


 


Lymph # (Auto)     (1.5-3.5)  10^3/uL


 


Mono # (Auto)     (0.0-1.0)  10^3/uL


 


Eos # (Auto)     (0.0-0.7)  10^3/uL


 


Baso # (Auto)     (0.0-0.1)  10^3/uL


 


Absolute Nucleated RBC     x10^3/uL


 


Nucleated RBC %     /100WBC


 


D-Dimer     (200.0-255.0)  ng/mL


 


Sodium     (135-145)  mmol/L


 


Potassium     (3.5-5.0)  mmol/L


 


Chloride     (101-111)  mmol/L


 


Carbon Dioxide     (21-32)  mmol/L


 


Anion Gap     (6-13)  


 


BUN     (6-20)  mg/dL


 


Creatinine     (0.4-1.0)  mg/dL


 


Estimated GFR (MDRD)     (>89)  


 


Glucose     ()  mg/dL


 


Calcium     (8.5-10.3)  mg/dL


 


Total Bilirubin     (0.2-1.0)  mg/dL


 


AST     (10-42)  IU/L


 


ALT     (10-60)  IU/L


 


Alkaline Phosphatase     ()  IU/L


 


Troponin I High Sens  9340.7 H*  9939.0 H*   (2.3-14.8)  ng/L


 


B-Natriuretic Peptide     (5-100)  pg/mL


 


Total Protein     (6.7-8.2)  g/dL


 


Albumin     (3.2-5.5)  g/dL


 


Globulin     (2.1-4.2)  g/dL


 


Albumin/Globulin Ratio     (1.0-2.2)  


 


Lipase     (22-51)  U/L


 


Nasal Adenovirus (PCR)    NOT DETECTED  


 


Nasal B. parapertussis DNA (PCR)    NOT DETECTED  


 


Nasal Coronavir 229E PCR    NOT DETECTED  


 


Nasal Coronavir HKU1 PCR    NOT DETECTED  


 


Nasal Coronavir NL63 PCR    NOT DETECTED  


 


Nasal Coronavir OC43 PCR    NOT DETECTED  


 


Nasal Enterovir/Rhinovir PCR    NOT DETECTED  


 


Nasal Influenza B PCR    NOT DETECTED  


 


Nasal Influenza A PCR    NOT DETECTED  


 


Nasal Parainfluen 1 PCR    NOT DETECTED  


 


Nasal Parainfluen 2 PCR    NOT DETECTED  


 


Nasal Parainfluen 3 PCR    NOT DETECTED  


 


Nasal Parainfluen 4 PCR    NOT DETECTED  


 


Nasal RSV (PCR)    NOT DETECTED  


 


Nasal B.pertussis DNA PCR    NOT DETECTED  


 


Nasal C.pneumoniae (PCR)    NOT DETECTED  


 


Hernan Human Metapneumo PCR    NOT DETECTED  


 


Nasal M.pneumoniae (PCR)    NOT DETECTED  


 


Nasal SARS-CoV-2 (PCR)    NOT DETECTED  














  10/15/21 10/15/21 10/15/21 Range/Units





  12:11 12:11 12:11 


 


WBC     (4.8-10.8)  x10^3/uL


 


RBC     (4.20-5.40)  10^6/uL


 


Hgb     (12.0-16.0)  g/dL


 


Hct     (37.0-47.0)  %


 


MCV     (81.0-99.0)  fL


 


MCH     (27.0-31.0)  pg


 


MCHC     (32.0-36.0)  g/dL


 


RDW     (12.0-15.0)  %


 


Plt Count     (130-450)  10^3/uL


 


MPV     (7.9-10.8)  fL


 


Neut # (Auto)     (1.5-6.6)  10^3/uL


 


Lymph # (Auto)     (1.5-3.5)  10^3/uL


 


Mono # (Auto)     (0.0-1.0)  10^3/uL


 


Eos # (Auto)     (0.0-0.7)  10^3/uL


 


Baso # (Auto)     (0.0-0.1)  10^3/uL


 


Absolute Nucleated RBC     x10^3/uL


 


Nucleated RBC %     /100WBC


 


D-Dimer  516.1 H    (200.0-255.0)  ng/mL


 


Sodium     (135-145)  mmol/L


 


Potassium     (3.5-5.0)  mmol/L


 


Chloride     (101-111)  mmol/L


 


Carbon Dioxide     (21-32)  mmol/L


 


Anion Gap     (6-13)  


 


BUN     (6-20)  mg/dL


 


Creatinine     (0.4-1.0)  mg/dL


 


Estimated GFR (MDRD)     (>89)  


 


Glucose     ()  mg/dL


 


Calcium     (8.5-10.3)  mg/dL


 


Total Bilirubin     (0.2-1.0)  mg/dL


 


AST     (10-42)  IU/L


 


ALT     (10-60)  IU/L


 


Alkaline Phosphatase     ()  IU/L


 


Troponin I High Sens    53787.2 H*  (2.3-14.8)  ng/L


 


B-Natriuretic Peptide   599 H   (5-100)  pg/mL


 


Total Protein     (6.7-8.2)  g/dL


 


Albumin     (3.2-5.5)  g/dL


 


Globulin     (2.1-4.2)  g/dL


 


Albumin/Globulin Ratio     (1.0-2.2)  


 


Lipase     (22-51)  U/L


 


Nasal Adenovirus (PCR)     


 


Nasal B. parapertussis DNA (PCR)     


 


Nasal Coronavir 229E PCR     


 


Nasal Coronavir HKU1 PCR     


 


Nasal Coronavir NL63 PCR     


 


Nasal Coronavir OC43 PCR     


 


Nasal Enterovir/Rhinovir PCR     


 


Nasal Influenza B PCR     


 


Nasal Influenza A PCR     


 


Nasal Parainfluen 1 PCR     


 


Nasal Parainfluen 2 PCR     


 


Nasal Parainfluen 3 PCR     


 


Nasal Parainfluen 4 PCR     


 


Nasal RSV (PCR)     


 


Nasal B.pertussis DNA PCR     


 


Nasal C.pneumoniae (PCR)     


 


Hernan Human Metapneumo PCR     


 


Nasal M.pneumoniae (PCR)     


 


Nasal SARS-CoV-2 (PCR)     














  10/15/21 10/15/21 Range/Units





  12:11 12:11 


 


WBC   12.7 H  (4.8-10.8)  x10^3/uL


 


RBC   3.65 L  (4.20-5.40)  10^6/uL


 


Hgb   10.3 L  (12.0-16.0)  g/dL


 


Hct   32.6 L  (37.0-47.0)  %


 


MCV   89.3  (81.0-99.0)  fL


 


MCH   28.2  (27.0-31.0)  pg


 


MCHC   31.6 L  (32.0-36.0)  g/dL


 


RDW   14.8  (12.0-15.0)  %


 


Plt Count   410  (130-450)  10^3/uL


 


MPV   10.7  (7.9-10.8)  fL


 


Neut # (Auto)   8.8 H  (1.5-6.6)  10^3/uL


 


Lymph # (Auto)   1.9  (1.5-3.5)  10^3/uL


 


Mono # (Auto)   1.5 H  (0.0-1.0)  10^3/uL


 


Eos # (Auto)   0.3  (0.0-0.7)  10^3/uL


 


Baso # (Auto)   0.1  (0.0-0.1)  10^3/uL


 


Absolute Nucleated RBC   0.00  x10^3/uL


 


Nucleated RBC %   0.0  /100WBC


 


D-Dimer    (200.0-255.0)  ng/mL


 


Sodium  142   (135-145)  mmol/L


 


Potassium  3.6   (3.5-5.0)  mmol/L


 


Chloride  102   (101-111)  mmol/L


 


Carbon Dioxide  27   (21-32)  mmol/L


 


Anion Gap  13.0   (6-13)  


 


BUN  16   (6-20)  mg/dL


 


Creatinine  0.7   (0.4-1.0)  mg/dL


 


Estimated GFR (MDRD)  86 L   (>89)  


 


Glucose  111 H   ()  mg/dL


 


Calcium  8.4 L   (8.5-10.3)  mg/dL


 


Total Bilirubin  0.7   (0.2-1.0)  mg/dL


 


AST  43 H   (10-42)  IU/L


 


ALT  64 H   (10-60)  IU/L


 


Alkaline Phosphatase  62   ()  IU/L


 


Troponin I High Sens    (2.3-14.8)  ng/L


 


B-Natriuretic Peptide    (5-100)  pg/mL


 


Total Protein  7.1   (6.7-8.2)  g/dL


 


Albumin  3.3   (3.2-5.5)  g/dL


 


Globulin  3.8   (2.1-4.2)  g/dL


 


Albumin/Globulin Ratio  0.9 L   (1.0-2.2)  


 


Lipase  58 H   (22-51)  U/L


 


Nasal Adenovirus (PCR)    


 


Nasal B. parapertussis DNA (PCR)    


 


Nasal Coronavir 229E PCR    


 


Nasal Coronavir HKU1 PCR    


 


Nasal Coronavir NL63 PCR    


 


Nasal Coronavir OC43 PCR    


 


Nasal Enterovir/Rhinovir PCR    


 


Nasal Influenza B PCR    


 


Nasal Influenza A PCR    


 


Nasal Parainfluen 1 PCR    


 


Nasal Parainfluen 2 PCR    


 


Nasal Parainfluen 3 PCR    


 


Nasal Parainfluen 4 PCR    


 


Nasal RSV (PCR)    


 


Nasal B.pertussis DNA PCR    


 


Nasal C.pneumoniae (PCR)    


 


Hernan Human Metapneumo PCR    


 


Nasal M.pneumoniae (PCR)    


 


Nasal SARS-CoV-2 (PCR)    














Assessment/Plan





- Problem List


(1) Acute on chronic systolic heart failure


Impression: 


Due to Ischemic cardiomyopathy. On Admission, patient had tachypnea but with 97%

 oxygen saturation on room air. Chest x-ray show mild pulmonary vascular edema, 

No suggestion consultation. Patient was just discharged from [facility] for 

Angiography with cardiac stents. Patient had echo study show 45% of EF. D-dimer 

is 516. Covid 19 is negative. Her cardiologist suggested diuretic for patient. 

Patient's shortness of breathing is likely caused by her pulmonary edema and new

 systolic heart failure from ischemic cardiomyopathy. ER already give patient 

40mg intravenous Lasix, and we continued it. Cardiology also asked for new ECHO.





New echo show patient had 40 to 45% of EF, With severe Hypokinesis of middle to 

apical anterior and lateral wall. We will continue patient home medication 

lisinopril, metoprolol, diuretics, and Lipitor, Patient may continue follow-up 

his cardiologist as outpatient. Patient may follow-up with cardiac wellness 

inpatient and outpatient





Her intake and output is down 1410 cc for yesterday.  This morning she is -610 

so far. she feels at baseline with her endurance and will go home today. 








(3) Elevated troponin


Conclusion/Plan: 


Patient denies chest pain, EKG did not review echo ischemic change. Patient had 

over 10,000 troponin on admission and it has been slowly decreasing.  She had 

25,000 when she was discharged from [facility]. pt had NSTEMI at [facility] 

and cardiac stents. Troponin enzyme was likely released from previous MI and 

expect it will continue trended down. Patient's cardiologist was consulted, he 

recommended to trend check serial of troponin and diuretics for patient. The 

troponin was downtrended.  We continued patient home medication aspirin, 

Brilinta, Metoprolol, And Lipitor. Telemetry was without any arrhythmias and BP 

was stable. 








(4) HTN (hypertension)


Conclusion/Plan: 


Patient blood pressure and clinically pt is stable, we will resume patient home 

medication lisinopril, HCTZ and metoprolol, and Lasix. 








(5) HLD (hyperlipidemia)


Conclusion/Plan: 


We will resume patient home Lipitor





(6) Hypokalemia.





Given supplment.  I will discharge her wiht 20 meq bid and get a K level on

## 2021-10-16 NOTE — DISCHARGE SUMMARY
Discharge Summary


Admit Date: 10/15/21


Discharge Date: 10/16/21


Discharging Provider: Chasity Stack MD


Primary Care Provider: WOLFGANG [place]


Code Status: Attempt Resuscitation


Condition at Discharge: Fair


Discharge Disposition: 01 Home, Self Care





- DIAGNOSES


Discharge Diagnoses with Status of Each Condition: 





1. Acute on chronic systolic heart failure


2. Ischemic cardiomyopathy


3. Elevated troponin


4. Hypertension


5. Hyperlipidemia


6. Hypokalemia





- HPI


History of Present Illness: 





This is a 56-year-old woman with A past medical history significant for 

hypertension, hyperlipidemia, Heart failure, s/p of cardiac stents who comes in 

ER for evaluation of shortness of breath. Patient was discharged from [facility] on yesterday. Patient reported she had 3 cardiac stents at 

there. she had angiography access through the right wrist. She developed 

complication when had cardiac angiography. she developed immediate in-stent 

rethrombosis necessitating multiple stents. she had three stents. per pt's 

report, her echo in hospital show an EF of 45%. She report cough but denies 

fever or chill. she denies chest pain. she report she had Covid 19 vaccinate. 

Covid 19 test is negative. CXR reveals mild prominence of the central pulmonary 

Vasculature bilaterally, No acute pulmonary consultation. Laboratory tests show 

patient had over 10,000 troponin But the patient had Troponin 25,000 on 

yesterday when she was discharged from [facility]. pt's Cardiologist was called

by the ER provider. He recommends diuresis to pt and admission of pt to the 

hospital for trending of the troponins. 


Discussed the care goal with the patient, patient hope to have full code 





- Past Medical History


Cardiovascular: reports: Hypertension, High cholesterol


Respiratory: reports: None


Neuro: reports: None


Endocrine/Autoimmune: reports: None


GI: reports: None


GYN: reports: None


: reports: None


HEENT: reports: None


Psych: reports: None


Musculoskeletal: reports: None


Derm: reports: None


MRSA Hx?: No





- Past Surgical History


/GYN: reports:  section, LEEP (Cervical surgery)


HEENT: reports: Tonsil/Adenoidectomy





- CONSULTS | PROCEDURES


Procedures: 





Repeat echocardiogram shows ejection fraction of 45 to 50% consistent with grade

3 diastolic heart function. Regional wall motion abnormalities are seen. Severe 

hypokinesis of the mid to apical anterior and lateral wall segments. Right ventr

icle normal. Increase in left atrial volume. This is a preliminary report and 

will need to be followed up with cardiology.





- HOSPITAL COURSE


Hospital Course: 





Cardiology requested the patient was diuresed. Repeat echocardiogram as above. 

She feels stable enough to return to home. She thinks most of her problem was 

anxiety because this shortness of breath is new to her. Especially with 

orthopnea. She has improved enough that she feels safe enough to go home. Lasix 

is increased to 40 mg on a regular basis. Consider twice daily if she gains more

than 3 pounds. Hypokalemia will be treated with twice daily potassium. I have 

ordered a BMP and a BNP for . She will need to see her cardiologist in

follow-up in the near future.





At discharge temperature 36.7. Heart rate 83. Blood pressure 102/58. 

Respirations 20. 98% on room air. Short morbidly overweight pleasant female. 

Sitting up in bed, watching TV, has eaten breakfast. Has gotten up to walk to 

the bathroom without any tachypnea or increased respiratory effort. Lungs were 

clear. No crackles. Abdomen is obese, soft, nontender. Trace pedal edema of her 

ankles.





Discharged in stable condition





- ALLERGIES


Allergies/Adverse Reactions: 


                                    Allergies











Allergy/AdvReac Type Severity Reaction Status Date / Time


 


No Known Drug Allergies Allergy   Verified 10/10/21 08:28














- MEDICATIONS


Home Medications: 


                                Ambulatory Orders











 Medication  Instructions  Recorded  Confirmed


 


Amlodipine Besylate [Norvasc] 10 mg PO DAILY 10/15/21 10/15/21


 


Aspirin [Walton Aspirin] 81 mg PO DAILY 10/15/21 10/15/21


 


Atorvastatin Calcium [Lipitor] 80 mg PO QPM 10/15/21 10/15/21


 


Losartan Potassium 25 mg PO DAILY 10/15/21 10/15/21


 


Metoprolol Succinate [Toprol Xl] 75 mg PO BID 10/15/21 10/15/21


 


Nitroglycerin [Nitrostat] 0.4 mg SL Q5MIN PRN 10/15/21 10/15/21


 


Ticagrelor [Brilinta] 90 mg PO BID 10/15/21 10/15/21


 


Furosemide [Lasix] 40 mg PO DAILY PRN #0 10/16/21 10/15/21


 


Potassium Chloride [Klor-Con M20] 20 meq PO BID #60 tab 10/16/21 10/15/21














- LABS


Result Diagrams: 


                                 10/16/21 05:20





                                 10/16/21 05:20

## 2021-10-16 NOTE — DISCHARGE PLAN
Discharge Plan


Problem Reviewed?: Yes


Disposition: 01 Home, Self Care


Condition: Fair


Diet: Cardiac


Activity Restrictions: Activity as Tolerated


Shower Restrictions: No


Driving Restrictions: No


Health Concerns: 


You came to our hospital emergency room because of worsening shortness of breath

to the point that it was making you feel anxious.  The shortness of breath was 

worse at night when you lay down.  You had recently just had 3 cardiac stents 

done at [facility] after having an near heart attack and had been 

diagnosed with congestive heart failure.  In our emergency room we find you to 

have mild congestive heart failure.  Your cardiologist wanted us to keep you 

overnight and repeat the ultrasound of your heart to make sure it was doing 

well.  You responded well to diuretic therapy and we were able to get you in a 

negative fluid balance. 


Plan of Treatment: 


1.  I am going to asked you to double up on your Lasix to take it twice a day 

for the next week.





2.  Because Lasix makes you lose potassium in your blood, I am also doubling up 

on your potassium to twice a day.





3.  Please see your cardiologist in the next week for follow-up.  I am having 

you get your blood drawn on Wednesday October 20 at [facility] to 

make sure that your potassium and congestive heart failure blood levels are 

okay. I am sending you home with a lab slip for that.





4.  Once your cardiologist says you are stable from your most recent procedure, 

please have him refer you to congestive heart failure classes here at the 

hospital or cardiopulmonary rehab. 


Care Goals: 


To stabilize your congestive heart failure and hopefully to have recovery of 

your heart muscle function in the next few weeks to months.


Assessment: 


Patient understands care goals, promises to follow-up with her cardiologist in 

the next week.  She will also follow-up with her primary care provider, Lisa Menchaca, in the next 2 to 3 weeks.


Follow-Up Care: [facility] - CHF Classes


No Smoking: If you smoke, Please STOP!  Call [phone] for help.


Follow-up with: 


Talita Menchaca ARNP [Primary Care Provider] -

## 2021-10-19 ENCOUNTER — HOSPITAL ENCOUNTER (OUTPATIENT)
Dept: HOSPITAL 76 - LAB | Age: 56
Discharge: HOME | End: 2021-10-19
Attending: INTERNAL MEDICINE
Payer: COMMERCIAL

## 2021-10-19 DIAGNOSIS — I50.9: Primary | ICD-10-CM

## 2021-10-19 LAB
ANION GAP SERPL CALCULATED.4IONS-SCNC: 10 MMOL/L (ref 6–13)
BUN SERPL-MCNC: 17 MG/DL (ref 6–20)
CALCIUM UR-MCNC: 9.4 MG/DL (ref 8.5–10.3)
CHLORIDE SERPL-SCNC: 103 MMOL/L (ref 101–111)
CO2 SERPL-SCNC: 25 MMOL/L (ref 21–32)
CREAT SERPLBLD-SCNC: 0.8 MG/DL (ref 0.4–1)
GFRSERPLBLD MDRD-ARVRAT: 74 ML/MIN/{1.73_M2} (ref 89–?)
GLUCOSE SERPL-MCNC: 115 MG/DL (ref 70–100)
POTASSIUM SERPL-SCNC: 4.3 MMOL/L (ref 3.5–5)
SODIUM SERPLBLD-SCNC: 138 MMOL/L (ref 135–145)

## 2021-10-19 PROCEDURE — 83880 ASSAY OF NATRIURETIC PEPTIDE: CPT

## 2021-10-19 PROCEDURE — 36415 COLL VENOUS BLD VENIPUNCTURE: CPT

## 2021-10-19 PROCEDURE — 80048 BASIC METABOLIC PNL TOTAL CA: CPT

## 2021-10-26 ENCOUNTER — HOSPITAL ENCOUNTER (EMERGENCY)
Dept: HOSPITAL 76 - ED | Age: 56
Discharge: HOME | End: 2021-10-26
Payer: COMMERCIAL

## 2021-10-26 VITALS — DIASTOLIC BLOOD PRESSURE: 53 MMHG | SYSTOLIC BLOOD PRESSURE: 98 MMHG

## 2021-10-26 DIAGNOSIS — I10: ICD-10-CM

## 2021-10-26 DIAGNOSIS — I25.2: ICD-10-CM

## 2021-10-26 DIAGNOSIS — R00.2: Primary | ICD-10-CM

## 2021-10-26 LAB
ANION GAP SERPL CALCULATED.4IONS-SCNC: 11 MMOL/L (ref 6–13)
BASOPHILS NFR BLD AUTO: 0.1 10^3/UL (ref 0–0.1)
BASOPHILS NFR BLD AUTO: 0.5 %
BUN SERPL-MCNC: 19 MG/DL (ref 6–20)
CALCIUM UR-MCNC: 9.5 MG/DL (ref 8.5–10.3)
CHLORIDE SERPL-SCNC: 103 MMOL/L (ref 101–111)
CO2 SERPL-SCNC: 26 MMOL/L (ref 21–32)
CREAT SERPLBLD-SCNC: 1.2 MG/DL (ref 0.4–1)
EOSINOPHIL # BLD AUTO: 0.2 10^3/UL (ref 0–0.7)
EOSINOPHIL NFR BLD AUTO: 1.5 %
ERYTHROCYTE [DISTWIDTH] IN BLOOD BY AUTOMATED COUNT: 13.9 % (ref 12–15)
GFRSERPLBLD MDRD-ARVRAT: 46 ML/MIN/{1.73_M2} (ref 89–?)
GLUCOSE SERPL-MCNC: 128 MG/DL (ref 70–100)
HCT VFR BLD AUTO: 39.2 % (ref 37–47)
HGB UR QL STRIP: 12.4 G/DL (ref 12–16)
LYMPHOCYTES # SPEC AUTO: 3.7 10^3/UL (ref 1.5–3.5)
LYMPHOCYTES NFR BLD AUTO: 25.9 %
MAGNESIUM SERPL-MCNC: 2.3 MG/DL (ref 1.7–2.8)
MCH RBC QN AUTO: 28.3 PG (ref 27–31)
MCHC RBC AUTO-ENTMCNC: 31.6 G/DL (ref 32–36)
MCV RBC AUTO: 89.5 FL (ref 81–99)
MONOCYTES # BLD AUTO: 1.2 10^3/UL (ref 0–1)
MONOCYTES NFR BLD AUTO: 8.4 %
NEUTROPHILS # BLD AUTO: 9 10^3/UL (ref 1.5–6.6)
NEUTROPHILS # SNV AUTO: 14.3 X10^3/UL (ref 4.8–10.8)
NEUTROPHILS NFR BLD AUTO: 63.4 %
NRBC # BLD AUTO: 0 /100WBC
NRBC # BLD AUTO: 0 X10^3/UL
PDW BLD AUTO: 10.5 FL (ref 7.9–10.8)
PLATELET # BLD: 654 10^3/UL (ref 130–450)
POTASSIUM SERPL-SCNC: 4.1 MMOL/L (ref 3.5–5)
RBC MAR: 4.38 10^6/UL (ref 4.2–5.4)
SODIUM SERPLBLD-SCNC: 140 MMOL/L (ref 135–145)

## 2021-10-26 PROCEDURE — 99284 EMERGENCY DEPT VISIT MOD MDM: CPT

## 2021-10-26 PROCEDURE — 36415 COLL VENOUS BLD VENIPUNCTURE: CPT

## 2021-10-26 PROCEDURE — 84484 ASSAY OF TROPONIN QUANT: CPT

## 2021-10-26 PROCEDURE — 99283 EMERGENCY DEPT VISIT LOW MDM: CPT

## 2021-10-26 PROCEDURE — 93005 ELECTROCARDIOGRAM TRACING: CPT

## 2021-10-26 PROCEDURE — 85025 COMPLETE CBC W/AUTO DIFF WBC: CPT

## 2021-10-26 PROCEDURE — 83735 ASSAY OF MAGNESIUM: CPT

## 2021-10-26 PROCEDURE — 80048 BASIC METABOLIC PNL TOTAL CA: CPT

## 2021-10-26 NOTE — ED PHYSICIAN DOCUMENTATION
History of Present Illness





- Stated complaint


Stated Complaint: FAST HEART BEAT





- Chief complaint


Chief Complaint: Cardiac





- History obtained from


History obtained from: Patient





- History of Present Illness


Timing: Today (19:30)


Pain level max: 0


Pain level now: 0


Improved by: no ameliorating factors


Worsened by: no exacerbating factors





- Additonal information


Additional information: 





patient c/o episodic rapid palpitations, onset 7:30 PM this evening. Onset was 

while ambulating in her daughters house. She denies chest 

pain/pressure/tightness, denies dyspnea. The symptoms have resolved 

spontaneously. She contacted her cardiologists group, on-call cardiologist 

recommended she come to ED evaluation given her recent cardiac problems: patient

was evaluated in this ED 10/10/21 and was subsequently transferred to 

[facility] in [place] for NSTEMI. Per patient, 3 coronary artery stents were

placed and procedure was complicated by thrombotic occlusion of the first stent 

before completion of the procedure (and thus the placement of 2 other stents at 

that time). She presented to this ED again on 10/15 with dyspnea, was admitted 

overnight to [facility] for serial enzymes, diuresis, and echo. Echo demonstrated EF 

of 45-50%, serial enzymes were very high but trended down during overnight stay 

and were noted to be significantly lower from the previous day which was the day

of discharge from [facility]





Review of Systems


Constitutional: reports: Reviewed and negative


Cardiac: reports: Palpitations.  denies: Chest pain / pressure, Pedal edema


Respiratory: reports: Reviewed and negative


GI: reports: Reviewed and negative





PD PAST MEDICAL HISTORY





- Past Medical History


Past Medical History: Yes


Cardiovascular: Hypertension, High cholesterol, MI


Respiratory: None


Neuro: None


Endocrine/Autoimmune: None


GI: None


GYN: None


: None


HEENT: None


Psych: None


Musculoskeletal: None


Derm: None





- Past Surgical History


Past Surgical History: Yes


/GYN:  section, LEEP (Cervical surgery)


HEENT: Tonsil/Adenoidectomy





- Present Medications


Home Medications: 


                                Ambulatory Orders











 Medication  Instructions  Recorded  Confirmed


 


Amlodipine Besylate [Norvasc] 10 mg PO DAILY 10/15/21 10/26/21


 


Aspirin [[place] Aspirin] 81 mg PO DAILY 10/15/21 10/26/21


 


Atorvastatin Calcium [Lipitor] 80 mg PO QPM 10/15/21 10/26/21


 


Losartan Potassium 25 mg PO DAILY 10/15/21 10/26/21


 


Metoprolol Succinate [Toprol Xl] 75 mg PO BID 10/15/21 10/26/21


 


Nitroglycerin [Nitrostat] 0.4 mg SL Q5MIN PRN 10/15/21 10/26/21


 


Ticagrelor [Brilinta] 90 mg PO BID 10/15/21 10/26/21


 


Furosemide [Lasix] 40 mg PO DAILY PRN #0 10/16/21 10/26/21


 


Potassium Chloride [Klor-Con M20] 20 meq PO BID #60 tab 10/16/21 10/26/21


 


Spironolactone [Aldactone] 12.5 mg PO DAILY 10/26/21 10/26/21














- Allergies


Allergies/Adverse Reactions: 


                                    Allergies











Allergy/AdvReac Type Severity Reaction Status Date / Time


 


No Known Drug Allergies Allergy   Verified 10/26/21 20:46














- Social History


Does the pt smoke?: No


Smoking Status: Never smoker


Does the pt drink ETOH?: Yes


Does the pt have substance abuse?: No





- Immunizations


Immunizations are current?: Yes





- POLST


Patient has POLST: No





PD ED PE NORMAL





- Vitals


Vital signs reviewed: Yes





- General


General: Alert and oriented X 3, No acute distress, Well developed/nourished





- Cardiac


Cardiac: RRR, No murmur, No gallop, No rub





- Respiratory


Respiratory: No respiratory distress, Clear bilaterally





- Abdomen


Abdomen: Soft, Non tender





- Derm


Derm: Normal color, Warm and dry





- Extremities


Extremities: No edema





Results





- Vitals


Vitals: 


                                     Oxygen











O2 Source                      Room air

















- Labs


Labs: 


                                Laboratory Tests











  10/26/21 10/26/21 10/26/21





  20:56 20:56 20:56


 


WBC   14.3 H 


 


RBC   4.38 


 


Hgb   12.4 


 


Hct   39.2 


 


MCV   89.5 


 


MCH   28.3 


 


MCHC   31.6 L 


 


RDW   13.9 


 


Plt Count   654 H 


 


MPV   10.5 


 


Neut # (Auto)   9.0 H 


 


Lymph # (Auto)   3.7 H 


 


Mono # (Auto)   1.2 H 


 


Eos # (Auto)   0.2 


 


Baso # (Auto)   0.1 


 


Absolute Nucleated RBC   0.00 


 


Nucleated RBC %   0.0 


 


Sodium  140  


 


Potassium  4.1  


 


Chloride  103  


 


Carbon Dioxide  26  


 


Anion Gap  11.0  


 


BUN  19  


 


Creatinine  1.2 H  


 


Estimated GFR (MDRD)  46 L  


 


Glucose  128 H  


 


Calcium  9.5  


 


Magnesium  2.3  


 


Troponin I High Sens    48.3 H*














PD MEDICAL DECISION MAKING





- ED course


Complexity details: reviewed old records, reviewed results, re-evaluated 

patient, considered differential, d/w patient


ED course: 





NSTEMI earlier this month with troponin levels at [facility] as high as over 

20,000 (per notes available from [facility] ED visit 10/15). she presents with 

episodic rapid palpitations but no other c/o, and palpitations resolved PTA. 

Rare PVC during tonights telemetry monitoring, no acute findings on EKG, and 

blood tests are reassuring: the high sensitivity troponin result is 48, but 

considering this test was over 20,000 prior to d/c from [place] earlier this 

month and was over 10,000 when she was evaluated in this ED 10/15 and remained 

over 9,000 when discharged from [facility] the following day, tonights result is 

likely reflective of continuing down-trend. Results reviewed with patient, she 

denies recurrence of palpitations during ED stay and she is comfortable with 

discharge home





Departure





- Departure


Disposition: 01 Home, Self Care


Clinical Impression: 


 Palpitations





Condition: Good


Instructions:  ED Palpitations


Discharge Date/Time: 10/26/21 22:45

## 2021-11-08 ENCOUNTER — HOSPITAL ENCOUNTER (EMERGENCY)
Dept: HOSPITAL 76 - ED | Age: 56
Discharge: HOME | End: 2021-11-08
Payer: COMMERCIAL

## 2021-11-08 VITALS — SYSTOLIC BLOOD PRESSURE: 111 MMHG | DIASTOLIC BLOOD PRESSURE: 78 MMHG

## 2021-11-08 DIAGNOSIS — Z95.5: ICD-10-CM

## 2021-11-08 DIAGNOSIS — I10: ICD-10-CM

## 2021-11-08 DIAGNOSIS — I48.0: Primary | ICD-10-CM

## 2021-11-08 DIAGNOSIS — Z79.01: ICD-10-CM

## 2021-11-08 DIAGNOSIS — Z79.82: ICD-10-CM

## 2021-11-08 DIAGNOSIS — I25.2: ICD-10-CM

## 2021-11-08 LAB
ALBUMIN DIAFP-MCNC: 3.8 G/DL (ref 3.2–5.5)
ALBUMIN/GLOB SERPL: 1 {RATIO} (ref 1–2.2)
ALP SERPL-CCNC: 62 IU/L (ref 42–121)
ALT SERPL W P-5'-P-CCNC: 27 IU/L (ref 10–60)
ANION GAP SERPL CALCULATED.4IONS-SCNC: 14 MMOL/L (ref 6–13)
AST SERPL W P-5'-P-CCNC: 33 IU/L (ref 10–42)
BASOPHILS NFR BLD AUTO: 0.1 10^3/UL (ref 0–0.1)
BASOPHILS NFR BLD AUTO: 0.5 %
BILIRUB BLD-MCNC: 0.8 MG/DL (ref 0.2–1)
BUN SERPL-MCNC: 20 MG/DL (ref 6–20)
CALCIUM UR-MCNC: 9 MG/DL (ref 8.5–10.3)
CHLORIDE SERPL-SCNC: 107 MMOL/L (ref 101–111)
CO2 SERPL-SCNC: 21 MMOL/L (ref 21–32)
CREAT SERPLBLD-SCNC: 0.9 MG/DL (ref 0.4–1)
EOSINOPHIL # BLD AUTO: 0.3 10^3/UL (ref 0–0.7)
EOSINOPHIL NFR BLD AUTO: 1.7 %
ERYTHROCYTE [DISTWIDTH] IN BLOOD BY AUTOMATED COUNT: 13.6 % (ref 12–15)
GFRSERPLBLD MDRD-ARVRAT: 65 ML/MIN/{1.73_M2} (ref 89–?)
GLOBULIN SER-MCNC: 3.8 G/DL (ref 2.1–4.2)
GLUCOSE SERPL-MCNC: 132 MG/DL (ref 70–100)
HCT VFR BLD AUTO: 36.7 % (ref 37–47)
HGB UR QL STRIP: 11.4 G/DL (ref 12–16)
LIPASE SERPL-CCNC: 47 U/L (ref 22–51)
LYMPHOCYTES # SPEC AUTO: 2 10^3/UL (ref 1.5–3.5)
LYMPHOCYTES NFR BLD AUTO: 13.7 %
MCH RBC QN AUTO: 27.9 PG (ref 27–31)
MCHC RBC AUTO-ENTMCNC: 31.1 G/DL (ref 32–36)
MCV RBC AUTO: 89.7 FL (ref 81–99)
MONOCYTES # BLD AUTO: 1.4 10^3/UL (ref 0–1)
MONOCYTES NFR BLD AUTO: 9.5 %
NEUTROPHILS # BLD AUTO: 10.7 10^3/UL (ref 1.5–6.6)
NEUTROPHILS # SNV AUTO: 14.4 X10^3/UL (ref 4.8–10.8)
NEUTROPHILS NFR BLD AUTO: 74.3 %
NRBC # BLD AUTO: 0 /100WBC
NRBC # BLD AUTO: 0 X10^3/UL
PDW BLD AUTO: 11.6 FL (ref 7.9–10.8)
PLATELET # BLD: 366 10^3/UL (ref 130–450)
POTASSIUM SERPL-SCNC: 4.1 MMOL/L (ref 3.5–5)
PROT SPEC-MCNC: 7.6 G/DL (ref 6.7–8.2)
RBC MAR: 4.09 10^6/UL (ref 4.2–5.4)
SODIUM SERPLBLD-SCNC: 142 MMOL/L (ref 135–145)

## 2021-11-08 PROCEDURE — 85025 COMPLETE CBC W/AUTO DIFF WBC: CPT

## 2021-11-08 PROCEDURE — 80053 COMPREHEN METABOLIC PANEL: CPT

## 2021-11-08 PROCEDURE — 99284 EMERGENCY DEPT VISIT MOD MDM: CPT

## 2021-11-08 PROCEDURE — 83690 ASSAY OF LIPASE: CPT

## 2021-11-08 PROCEDURE — 93005 ELECTROCARDIOGRAM TRACING: CPT

## 2021-11-08 PROCEDURE — 96374 THER/PROPH/DIAG INJ IV PUSH: CPT

## 2021-11-08 PROCEDURE — 99283 EMERGENCY DEPT VISIT LOW MDM: CPT

## 2021-11-08 PROCEDURE — 36415 COLL VENOUS BLD VENIPUNCTURE: CPT

## 2021-11-08 NOTE — ED PHYSICIAN DOCUMENTATION
History of Present Illness





- Stated complaint


Stated Complaint: RAPID HEART RATE





- Chief complaint


Chief Complaint: Cardiac





- History obtained from


History obtained from: Patient





- Additonal information


Additional information: 


Patient comes to the emergency department chief complaint of palpitations.  She 

states that she woke up with the sensation that her heart was racing, and felt a

little bit of chest pressure but no pain or shortness of breath.  She states 

that she has had these episodes on and off since having a STEMI on October 10 

with 3 stents placed immediately following.  She has been to the emergency 

department for the same thing approximately 2 weeks ago, but symptoms had 

resolved by the time she got here and patient was found to be in normal sinus 

rhythm at that time.  She is currently on Aspirin, Brilinta, and metoprolol.  

Patient states that she felt as though her heart rate started to go faster 

around 7:00 this morning and finally decided to call an ambulance.  She states s

he experienced a slight bit of sweating at that time, but that has resolved.  No

other symptoms whatsoever.  Patient has no history of coronary artery disease or

dysrhythmia prior to this last month that she was aware of.  She states she has 

a history of hypertension but otherwise was healthy as far as she knew.  No 

diabetes.  She currently states that she feels as though her heart is beating 

fast but otherwise has no other complaints at this time.








Review of Systems


Ten Systems: 10 systems reviewed and negative


Constitutional: reports: Reviewed and negative


Eyes: reports: Reviewed and negative


Ears: reports: Reviewed and negative


Nose: reports: Reviewed and negative


Throat: reports: Reviewed and negative


Cardiac: reports: Palpitations


Respiratory: reports: Reviewed and negative


GI: reports: Reviewed and negative.  denies: Nausea, Vomiting


: reports: Reviewed and negative


Skin: reports: Reviewed and negative


Musculoskeletal: reports: Reviewed and negative


Neurologic: reports: Reviewed and negative


Psychiatric: reports: Reviewed and negative


Endocrine: reports: Reviewed and negative


Immunocompromised: reports: Reviewed and negative





PD PAST MEDICAL HISTORY





- Past Medical History


Past Medical History: Yes


Cardiovascular: Hypertension, High cholesterol, MI


Respiratory: None


Neuro: None


Endocrine/Autoimmune: None


GI: None


GYN: None


: None


HEENT: None


Psych: None


Musculoskeletal: None


Derm: None





- Past Surgical History


Past Surgical History: Yes


/GYN:  section, LEEP (Cervical surgery)


HEENT: Tonsil/Adenoidectomy





- Present Medications


Home Medications: 


                                Ambulatory Orders











 Medication  Instructions  Recorded  Confirmed


 


Amlodipine Besylate [Norvasc] 10 mg PO DAILY 10/15/21 10/26/21


 


Aspirin [[place] Aspirin] 81 mg PO DAILY 10/15/21 10/26/21


 


Atorvastatin Calcium [Lipitor] 80 mg PO QPM 10/15/21 10/26/21


 


Losartan Potassium 25 mg PO DAILY 10/15/21 10/26/21


 


Metoprolol Succinate [Toprol Xl] 75 mg PO BID 10/15/21 10/26/21


 


Nitroglycerin [Nitrostat] 0.4 mg SL Q5MIN PRN 10/15/21 10/26/21


 


Ticagrelor [Brilinta] 90 mg PO BID 10/15/21 10/26/21


 


Furosemide [Lasix] 40 mg PO DAILY PRN #0 10/16/21 10/26/21


 


Potassium Chloride [Klor-Con M20] 20 meq PO BID #60 tab 10/16/21 10/26/21


 


Spironolactone [Aldactone] 12.5 mg PO DAILY 10/26/21 10/26/21


 


diltiaZEM CD [Cardizem Cd] 180 mg PO DAILY #30 21 














- Allergies


Allergies/Adverse Reactions: 


                                    Allergies











Allergy/AdvReac Type Severity Reaction Status Date / Time


 


No Known Drug Allergies Allergy   Verified 21 07:54














- Social History


Does the pt smoke?: No


Smoking Status: Never smoker


Does the pt drink ETOH?: Yes


Does the pt have substance abuse?: No





- Immunizations


Immunizations are current?: Yes





- POLST


Patient has POLST: No





PD ED PE NORMAL





- Vitals


Vital signs reviewed: Yes





- General


General: Alert and oriented X 3, No acute distress, Well developed/nourished 

(Moderately obese)





- HEENT


HEENT: Atraumatic, PERRL, EOMI, Moist mucous membranes





- Neck


Neck: Supple, no meningeal sign





- Cardiac


Cardiac: No murmur, Strong equal pulses, Other (Tachycardic rate irregular 

rhythm)





- Respiratory


Respiratory: No respiratory distress, Clear bilaterally





- Abdomen


Abdomen: Soft, Non tender, Other (Base)





- Derm


Derm: Normal color, Warm and dry, No rash





- Extremities


Extremities: No deformity, No edema, No calf tenderness / cord





- Neuro


Neuro: Alert and oriented X 3, CNs 2-12 intact, Normal speech, Other (Plan tact)





- Psych


Psych: Normal mood, Normal affect





Results





- Vitals


Vitals: 


                                     Oxygen











O2 Source                      Room air

















- EKG (time done)


  ** 0752


Rate: Rate (enter#) (122)


Rhythm: Atrial fibrillation


Axis: Normal


Intervals: No: Normal ME (No P-waves)


QRS: Normal


Ischemia: Normal ST segments, Q waves (II, AVF, V5, V6)


Compare to prior EKG: Other (No significant change from most recent EKG on 

.)


Computer interpretation: Agree with computer





- Labs


Labs: 


                                Laboratory Tests











  21





  07:53 08:14


 


WBC   14.4 H


 


RBC   4.09 L


 


Hgb   11.4 L


 


Hct   36.7 L


 


MCV   89.7


 


MCH   27.9


 


MCHC   31.1 L


 


RDW   13.6


 


Plt Count   366


 


MPV   11.6 H


 


Neut # (Auto)   10.7 H


 


Lymph # (Auto)   2.0


 


Mono # (Auto)   1.4 H


 


Eos # (Auto)   0.3


 


Baso # (Auto)   0.1


 


Absolute Nucleated RBC   0.00


 


Nucleated RBC %   0.0


 


Sodium  142 


 


Potassium  4.1 


 


Chloride  107 


 


Carbon Dioxide  21 


 


Anion Gap  14.0 H 


 


BUN  20 


 


Creatinine  0.9 


 


Estimated GFR (MDRD)  65 L 


 


Glucose  132 H 


 


Calcium  9.0 


 


Total Bilirubin  0.8 


 


AST  33 


 


ALT  27 


 


Alkaline Phosphatase  62 


 


Total Protein  7.6 


 


Albumin  3.8 


 


Globulin  3.8 


 


Albumin/Globulin Ratio  1.0 


 


Lipase  47 














PD MEDICAL DECISION MAKING





- ED course


Complexity details: reviewed results, re-evaluated patient, considered 

differential, d/w patient


ED course: 


The patient was given IV fluids and worked up with laboratory studies.  EKG did 

not show any significant changes.  She was found to be in atrial fibrillation, 

which I suspected that she had been having paroxysmally since her MI, given the 

prior symptoms.  The patient was given 25 mg of Cardizem IV, with good response.

 I started the pt on oral Cardizem.  She has a follow-up scheduled with her 

cardiologist in the next week.  I have encouraged her to discuss a plan for her 

episodes of a. fib with her cardiologist.








Departure





- Departure


Disposition: 01 Home, Self Care


Clinical Impression: 


Atrial fibrillation


Qualifiers:


 Atrial fibrillation type: paroxysmal Qualified Code(s): I48.0 - Paroxysmal 

atrial fibrillation





Condition: Stable


Instructions:  ED Afib


Prescriptions: 


diltiaZEM CD [Cardizem Cd] 180 mg PO DAILY #30


Comments: 


Your labs look good.  You have been found to be in an irregular rhythm called 

atrial fibrillation.  Given that you have been having the symptoms on and off 

since your heart attack, it is likely that you were going in and out of A. fib. 

We have given you medication today to slow your heart rate down, and we will 

give you an oral preparation of the same medication.  Hopefully, the episodes of

atrial fibrillation will cease, wench your heart has recovered from the heart 

attack that you had.  Please follow-up with your cardiologist and talk to him 

about whether you should stay on the medication we have prescribed or whether he

would prefer that you be on something different.  If you begin having a very 

fast heart rate, say greater than 120 bpm, that is not coming down despite your 

medications, then please return to the emergency department.  Please also return

if you develop chest pain or shortness of breath.  Otherwise, be sure to drink 

plenty of fluids to stay hydrated and decrease the chances of your heart going 

fast for other reasons


Discharge Date/Time: 21 09:55

## 2021-11-17 ENCOUNTER — HOSPITAL ENCOUNTER (OUTPATIENT)
Dept: HOSPITAL 76 - LAB | Age: 56
Discharge: HOME | End: 2021-11-17
Attending: INTERNAL MEDICINE
Payer: COMMERCIAL

## 2021-11-17 DIAGNOSIS — I25.110: Primary | ICD-10-CM

## 2021-11-17 DIAGNOSIS — I25.5: ICD-10-CM

## 2021-11-17 LAB
ANION GAP SERPL CALCULATED.4IONS-SCNC: 12 MMOL/L (ref 6–13)
BUN SERPL-MCNC: 18 MG/DL (ref 6–20)
CALCIUM UR-MCNC: 8.8 MG/DL (ref 8.5–10.3)
CHLORIDE SERPL-SCNC: 97 MMOL/L (ref 101–111)
CO2 SERPL-SCNC: 25 MMOL/L (ref 21–32)
CREAT SERPLBLD-SCNC: 1.1 MG/DL (ref 0.4–1)
GFRSERPLBLD MDRD-ARVRAT: 51 ML/MIN/{1.73_M2} (ref 89–?)
GLUCOSE SERPL-MCNC: 137 MG/DL (ref 70–100)
POTASSIUM SERPL-SCNC: 4.2 MMOL/L (ref 3.5–5)
SODIUM SERPLBLD-SCNC: 134 MMOL/L (ref 135–145)

## 2021-11-17 PROCEDURE — 36415 COLL VENOUS BLD VENIPUNCTURE: CPT

## 2021-11-17 PROCEDURE — 80048 BASIC METABOLIC PNL TOTAL CA: CPT

## 2021-11-26 ENCOUNTER — HOSPITAL ENCOUNTER (EMERGENCY)
Dept: HOSPITAL 76 - ED | Age: 56
Discharge: HOME | End: 2021-11-26
Payer: COMMERCIAL

## 2021-11-26 VITALS — SYSTOLIC BLOOD PRESSURE: 117 MMHG | DIASTOLIC BLOOD PRESSURE: 79 MMHG

## 2021-11-26 DIAGNOSIS — L50.9: Primary | ICD-10-CM

## 2021-11-26 DIAGNOSIS — R22.0: ICD-10-CM

## 2021-11-26 PROCEDURE — 99284 EMERGENCY DEPT VISIT MOD MDM: CPT

## 2021-11-26 PROCEDURE — 99282 EMERGENCY DEPT VISIT SF MDM: CPT

## 2021-11-26 NOTE — ED PHYSICIAN DOCUMENTATION
History of Present Illness





- Stated complaint


Stated Complaint: FACE SWELLING





- Chief complaint


Chief Complaint: Wound





- History obtained from


History obtained from: Patient





- History of Present Illness


Timing: Yesterday


Pain level max: 0


Pain level now: 0





- Additonal information


Additional information: 





Patient is a 56-year-old female who presents to the emergency department with 

itching on the bilateral arms as well as urticaria since yesterday.  This 

morning she awoke and felt like her face and lips were swollen.  She took 50 mg 

of Benadryl and states that she is currently feeling better.  She was recently 

started on several new medications after a MI.  No difficulty breathing, 

swallowing.  No wheezing or stridor.  Has not had similar symptoms previously.





Review of Systems


Constitutional: denies: Fever, Chills


GI: denies: Vomiting, Diarrhea


Skin: reports: Rash


Musculoskeletal: denies: Neck pain, Back pain


Neurologic: denies: Headache





PD PAST MEDICAL HISTORY





- Past Medical History


Past Medical History: Yes


Cardiovascular: Hypertension, High cholesterol, MI


Respiratory: None


Neuro: None


Endocrine/Autoimmune: None


GI: None


GYN: None


: None


HEENT: None


Psych: None


Musculoskeletal: None


Derm: None





- Past Surgical History


Past Surgical History: Yes


/GYN:  section, LEEP (Cervical surgery)


Cardiovascular: Coronary stent


HEENT: Tonsil/Adenoidectomy





- Present Medications


Home Medications: 


                                Ambulatory Orders











 Medication  Instructions  Recorded  Confirmed


 


Atorvastatin Calcium [Lipitor] 80 mg PO QPM 10/15/21 11/26/21


 


Losartan Potassium 25 mg PO DAILY 10/15/21 11/26/21


 


Metoprolol Succinate [Toprol Xl] 100 mg PO BID 10/15/21 11/26/21


 


Nitroglycerin [Nitrostat] 0.4 mg SL Q5MIN PRN 10/15/21 11/26/21


 


Potassium Chloride [Klor-Con M20] 20 meq PO BID #60 tab 10/16/21 11/26/21


 


Spironolactone [Aldactone] 25 mg PO DAILY 10/26/21 11/26/21


 


Apixaban [Eliquis] 5 mg PO BID 21


 


Furosemide [Lasix] 60 mg PO BID 21


 


predniSONE [Deltasone] 10 mg PO [id number] #42 tab 21 














- Allergies


Allergies/Adverse Reactions: 


                                    Allergies











Allergy/AdvReac Type Severity Reaction Status Date / Time


 


No Known Drug Allergies Allergy   Verified 21 06:42














- Social History


Does the pt smoke?: No


Smoking Status: Never smoker


Does the pt drink ETOH?: Yes


Does the pt have substance abuse?: No





- Immunizations


Immunizations are current?: Yes





- POLST


Patient has POLST: No





PD ED PE NORMAL





- Vitals


Vital signs reviewed: Yes





- General


General: Alert and oriented X 3, No acute distress





- HEENT


HEENT: Moist mucous membranes





- Neck


Neck: Supple, no meningeal sign





- Cardiac


Cardiac: RRR, Strong equal pulses





- Respiratory


Respiratory: No respiratory distress, Clear bilaterally





- Abdomen


Abdomen: Soft, Non tender, Non distended





- Derm


Derm: Warm and dry





- Neuro


Neuro: Alert and oriented X 3





- Free text exam


Free text exam: 





Diffuse facial swelling, mild urticaria on the bilateral arms and upper back.  

No wheezing.  No stridor





Results





- Vitals


Vitals: 


                               Vital Signs - 24 hr











  21





  06:38


 


Temperature 36.9 C


 


Heart Rate 80


 


Respiratory 16





Rate 


 


Blood Pressure 117/79


 


O2 Saturation 97








                                     Oxygen











O2 Source                      Room air

















PD MEDICAL DECISION MAKING





- ED course


Complexity details: re-evaluated patient, considered differential, d/w patient


ED course: 





56-year-old female with urticaria and facial swelling.  Unclear etiology.  Could

 be due to the Brilinta or the spironolactone. We will trial her on steroids for

 home and see how she progresses.  No significant angioedema.  Normal intraoral 

exam.  No stridor. Patient took Benadryl prior to arrival. Given steroids here 

and the swelling has been steadily decreasing. Talking and breathing normally. 

normal phonation. Patient counseled regarding signs and symptoms for which I 

believe and urgent re-evaluation would be necessary. Patient with good 

understanding of and agreement to plan and is comfortable going home at this 

time





This document was made in part using voice recognition software. While efforts 

are made to proofread this document, sound alike and grammatical errors may 

occur.





Departure





- Departure


Disposition: 01 Home, Self Care


Clinical Impression: 


 Urticaria, Facial swelling





Condition: Good


Instructions:  ED Urticaria


Follow-Up: 


Talita Menchaca ARNP [Primary Care Provider] - Within 1 week


Prescriptions: 


predniSONE [Deltasone] 10 mg PO [id number] #42 tab


Comments: 


This could be due to your new medications including brillinta or spironolactone.

  I recommend contacting your cardiologist today to discuss if they would like 

to stop or change any of your medications. 





Your prescriptions were sent to [facility] in [place].

## 2022-01-20 ENCOUNTER — HOSPITAL ENCOUNTER (OUTPATIENT)
Dept: HOSPITAL 76 - DI.N | Age: 57
Discharge: HOME | End: 2022-01-20
Attending: NURSE PRACTITIONER
Payer: COMMERCIAL

## 2022-01-20 DIAGNOSIS — Z80.3: ICD-10-CM

## 2022-01-20 DIAGNOSIS — R92.8: ICD-10-CM

## 2022-01-20 DIAGNOSIS — Z12.31: Primary | ICD-10-CM

## 2022-01-21 NOTE — MAMMOGRAPHY REPORT
BILATERAL DIGITAL SCREENING MAMMOGRAM 3D/2D: 1/20/2022

CLINICAL: Family history of breast cancer. Routine screening.  

 

Comparison is made to exams dated:  8/29/2017 mammogram and 8/18/2014 mammogram - [facility].  There are scattered fibroglandular elements in both breasts.  

 

There is a possible equal density linear dilated duct with indistinct and circumscribed margins in th
e right breast at 7 o'clock mid to posterior depth.  

No other significant masses, calcifications, or other findings are seen in either breast.  

 

IMPRESSION: INCOMPLETE: NEEDS ADDITIONAL IMAGING EVALUATION

The possible equal density dilated duct in the right breast is indeterminate.  An ultrasound is recom
mended.  

 

 

This exam was interpreted at Station ID: [id number].  

 

NOTE: For mammograms, a report in lay terms will be sent to the patient. Approximately 15% of breast 
malignancies will not be visualized mammographically. In the management of a palpable breast mass, a 
negative mammogram must not discourage biopsy of a clinically suspicious lesion.

 

Electronically Signed By: Renny Diaz M.D.          

ddp/:1/20/2022 15:20:09  

 

 

 

ACR BI-RADS Category 0: Incomplete [id number]

PARENCHYMAL PATTERN: (A) - The breast(s) demonstrate(s) scattered fibroglandular densities.

BI-RADS CATEGORY: (0) - 0

Ultrasound

[id number]

Immediate follow-up

LATERALITY: (B)

## 2022-02-23 ENCOUNTER — HOSPITAL ENCOUNTER (OUTPATIENT)
Dept: HOSPITAL 76 - DI | Age: 57
Discharge: HOME | End: 2022-02-23
Attending: NURSE PRACTITIONER
Payer: COMMERCIAL

## 2022-02-23 DIAGNOSIS — R92.8: Primary | ICD-10-CM

## 2022-02-24 NOTE — ULTRASOUND REPORT
LIMITED ULTRASOUND OF RIGHT BREAST: 2/23/2022

CLINICAL: Additional evaluation requested from prior study.  

 

Comparison is made to exams dated:  1/20/2022 mammogram, 8/29/2017 mammogram, and 8/18/2014 mammogram
 - [facility].  

Color flow ultrasound of the right breast 7-8 o'clock region was performed.  Gray scale images of the
 real-time examination were reviewed.  

 

There is a benign dilated duct in the right breast at 8 o'clock posterior depth.  This dilated duct d
isplays posterior acoustic enhancement.  

 

 

IMPRESSION: BENIGN 

There is no sonographic evidence of malignancy.  

The dilated duct in the right breast is benign.  

A 1 year screening mammogram is recommended.   

 

This exam was interpreted at Station ID: [id number].  

Electronically Signed By: Ascencion Arroyo M.D., jr/fide:2/23/2022 15:30:12  

 

 

 

Ultrasound BI-RADS: 2 Benign

BI-RADS CATEGORY: (2) - 2

RECOMMENDATION: (ANNUAL)  - Recommend routine annual screening mammography.

[id number]

1 year screening

LATERALITY: (B)

## 2022-06-26 ENCOUNTER — HOSPITAL ENCOUNTER (EMERGENCY)
Dept: HOSPITAL 76 - ED | Age: 57
LOS: 1 days | Discharge: HOME | End: 2022-06-27
Payer: COMMERCIAL

## 2022-06-26 VITALS — DIASTOLIC BLOOD PRESSURE: 64 MMHG | SYSTOLIC BLOOD PRESSURE: 112 MMHG

## 2022-06-26 DIAGNOSIS — S89.91XA: Primary | ICD-10-CM
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
## 2022-06-26 PROCEDURE — 99283 EMERGENCY DEPT VISIT LOW MDM: CPT

## 2022-06-26 PROCEDURE — 99282 EMERGENCY DEPT VISIT SF MDM: CPT

## 2022-06-26 PROCEDURE — 73562 X-RAY EXAM OF KNEE 3: CPT

## 2022-06-26 PROCEDURE — 96372 THER/PROPH/DIAG INJ SC/IM: CPT

## 2022-06-27 NOTE — ED PHYSICIAN DOCUMENTATION
History of Present Illness





- Stated complaint


Stated Complaint: RT KNEE PX





- Chief complaint


Chief Complaint: General





- History obtained from


History obtained from: Patient





- Additonal information


Additional information: 


Patient is a 57-year-old female presenting for evaluation of right knee pain 

that occurred This evening.  Patient was sitting on the couch when she raised 

her right leg up and was stretching it out when she felt a pop in the knee.  She

had immediate pain and reports having difficulty with ambulation due to pain.  

She reports an injury in the extremity in the past.  She denies any numbness.  

She denies pain elsewhere.  She did not fall or hit her head.She has not taken 

anything for the pain.  It is worse with range of motion as well as attempts at 

ambulation and better at rest.  The pain is sharp. Patient tested positive for 

COVID-19 on Tuesday.  She started Monday with a dry cough.  She reports feeling 

that she has mild symptoms.





Review of Systems


Constitutional: denies: Fever


Nose: denies: Congestion


Cardiac: denies: Chest pain / pressure


Respiratory: reports: Cough (Nonproductive, Positive for COVID-19 earlier in the

week).  denies: Dyspnea


GI: denies: Abdominal Pain


: denies: Dysuria


Skin: denies: Laceration (s)


Musculoskeletal: reports: Joint pain


Neurologic: denies: Head injury





PD PAST MEDICAL HISTORY





- Past Medical History


Cardiovascular: Hypertension, High cholesterol, MI


Respiratory: None


Neuro: None


Endocrine/Autoimmune: None


GI: None


GYN: None


: None


HEENT: None


Psych: None


Musculoskeletal: None


Derm: None





- Past Surgical History


Past Surgical History: Yes


/GYN:  section, LEEP (Cervical surgery)


Cardiovascular: Coronary stent


HEENT: Tonsil/Adenoidectomy





- Present Medications


Home Medications: 


                                Ambulatory Orders











 Medication  Instructions  Recorded  Confirmed


 


Atorvastatin Calcium [Lipitor] 80 mg PO QPM 10/15/21 11/26/21


 


Losartan Potassium 25 mg PO DAILY 10/15/21 11/26/21


 


Metoprolol Succinate [Toprol Xl] 100 mg PO BID 10/15/21 11/26/21


 


Nitroglycerin [Nitrostat] 0.4 mg SL Q5MIN PRN 10/15/21 11/26/21


 


Potassium Chloride [Klor-Con M20] 20 meq PO BID #60 tab 10/16/21 11/26/21


 


Spironolactone [Aldactone] 25 mg PO DAILY 10/26/21 11/26/21


 


Apixaban [Eliquis] 5 mg PO BID 21


 


Furosemide [Lasix] 60 mg PO BID 21


 


predniSONE [Deltasone] 10 mg PO [id number] #42 tab 21 


 


Oxycodone HCl/Acetaminophen 1 each PO Q6H PRN #10 tablet 22 





[Percocet 5-325 mg Tablet]   














- Allergies


Allergies/Adverse Reactions: 


                                    Allergies











Allergy/AdvReac Type Severity Reaction Status Date / Time


 


No Known Drug Allergies Allergy   Verified 21 06:42














- Social History


Does the pt smoke?: No


Smoking Status: Never smoker


Does the pt drink ETOH?: Yes


Does the pt have substance abuse?: No





- Immunizations


Immunizations are current?: Yes





- POLST


Patient has POLST: No





PD ED PE NORMAL





- General


General: Alert and oriented X 3, No acute distress, Well developed/nourished





- HEENT


HEENT: Atraumatic





- Cardiac


Cardiac: RRR, Strong equal pulses





- Respiratory


Respiratory: No respiratory distress





- Extremities


Extremities: No deformity, No edema.  No: No tenderness to palpate, Normal ROM s

pain





PD ED PE EXPANDED





- Extremities


Extremities: Right knee (Tenderness to palpation, no laxity noted with varus or 

valgus stress or anterior posterior stress), Pedal Pulses Present, Motor intact,

Sensory intact, Vascular intact.  No: Pedal edema R, Right calf TTP/cord


INGRID LE visual: 


                            __________________________














                            __________________________





 1 - tenderness








Results





- Vitals


Vitals: 


                               Vital Signs - 24 hr











  22





  23:18


 


Temperature 36.6 C


 


Heart Rate 54 L


 


Respiratory 16





Rate 


 


Blood Pressure 112/64


 


O2 Saturation 100








                                     Oxygen











O2 Source                      Room air

















PD MEDICAL DECISION MAKING





- ED course


Complexity details: reviewed results, re-evaluated patient, d/w patient


ED course: 


Patient with right knee pain after stretching the knee and feeling a pop.  

Neurovascularly intact.  X-rays negative for fracture dislocation.  Low 

suspicion for occult fracture given mechanism.  Patient placed into a knee 

immobilizer and given a walker.She is comfortable with plan for discharge and 

aware of need for follow-up with PCP or orthopedic doctor.  Patient additionally

recently positive for COVID-19.  As she has no significant respiratory 

complaints and her oxygenation is 100%.








Departure





- Departure


Disposition: 01 Home, Self Care


Clinical Impression: 


Right knee injury


Qualifiers:


 Encounter type: initial encounter Qualified Code(s): S89.91XA - Unspecified 

injury of right lower leg, initial encounter





Condition: Stable


Instructions:  ED Knee Pain UKO


Prescriptions: 


Oxycodone HCl/Acetaminophen [Percocet 5-325 mg Tablet] 1 each PO Q6H PRN #10 

tablet


 PRN Reason: pain


Comments: 


You were evaluated for an injury to your right knee.  Your x-ray does not show a

broken or dislocated bone.  However you can still have an injury to your knee 

involving the ligaments or other structures.  We have applied a knee immobilizer

and have instructed for you to try and stay off the leg while it is hurting to 

walk on it. Please follow-up with your primary care doctor in the next week.  If

your symptoms or not improving you may need further testing such as an MRI or 

referral to an orthopedic surgeon.I have prescribed a short course of pain 

medication to help.  I have sent this prescription to [facility] in [place].





I am prescribing a short course of narcotic pain medication for you. These are 

potentially dangerous and addictive medications that should be used carefully.


These medications may constipate you. Take an over-the-counter stool softener 

(docusate) twice daily with plenty of water while taking these medications. If 

you go 24 hours without a bowel movement, take over-the-counter miralax, per 

package instructions.


Do not drink or drive while taking these medications.


If you received narcotic or sedating medications while in the emergency 

department, do not drive for 24 hours.


Store this medication in a safe, secure place and out of reach of children.


It is a violation of federal law to give or sell this medication to another 

person or to use in a manner other than prescribed.


The ED will not refill narcotic prescriptions, including prescriptions lost or 

stolen.


To dispose of unwanted medications:


1. [facility] at [address] in 

[place] has a medication drop box. They accept prescription medications (in 

pill form) Monday through Friday 9:00 a.m. to 5:00 p.m.


2. [facility], [place] [employer] accepts prescription medications (in

pill form only) for disposal year round. Call [phone] for more 

information.


3. Contact the [facility] for the next [facility] sponsored prescription 

drug collection event. [phone], [phone], or [phone] 

[phone];


Note that many narcotic pain relievers also contain Tylenol/acetaminophen. 

Please ensure that your total dose of acetaminophen from all sources does not e

xceed 3 g (3000 mg) per day.


Discharge Date/Time: 22 02:15

## 2022-06-27 NOTE — XRAY REPORT
PROCEDURE:  Knee 3 View RT

 

INDICATIONS:  pain

 

TECHNIQUE:  4 views of the right knee were acquired.  

 

COMPARISON:  None.

 

FINDINGS:  

 

Bones:  No fractures or dislocations. There is osteophytosis in the lateral and patellofemoral compar
tments. Mild joint space narrowing is demonstrated in the lateral compartment with subchondral sclero
sis. There is lateral tilt and shift of the patella with severe narrowing in the patellofemoral lauro
rtment laterally. No suspicious bony lesions.  

 

Soft tissues: There is a large lateral suprapatellar calcification measuring up to 5.6 cm suggestive 
of a large joint body. There is a suspected small joint effusion with evaluation limited by the large
 calcification.

 

IMPRESSION:  

 

1. No fracture or dislocation.

 

2. Moderate osteoarthritic changes most prominent in the patellofemoral compartment.

 

3. Suspected large suprapatellar body and small joint effusion. 

 

Reviewed by: Renny Malhotra MD on 6/27/2022 1:42 AM PDT

Approved by: Renny Malhotra MD on 6/27/2022 1:42 AM PDT

 

 

Station ID:  [id number]

## 2022-07-21 ENCOUNTER — HOSPITAL ENCOUNTER (OUTPATIENT)
Dept: HOSPITAL 76 - LAB | Age: 57
Discharge: HOME | End: 2022-07-21
Attending: INTERNAL MEDICINE
Payer: COMMERCIAL

## 2022-07-21 DIAGNOSIS — I48.0: ICD-10-CM

## 2022-07-21 DIAGNOSIS — I25.5: Primary | ICD-10-CM

## 2022-07-21 LAB
CHOLEST SERPL-MCNC: 103 MG/DL
HDLC SERPL-MCNC: 45 MG/DL
HDLC SERPL: 2.3 {RATIO} (ref ?–4.4)
LDLC SERPL CALC-MCNC: 46 MG/DL
LDLC/HDLC SERPL: 1 {RATIO} (ref ?–4.4)
TRIGL P FAST SERPL-MCNC: 62 MG/DL
VLDLC SERPL-SCNC: 12 MG/DL

## 2022-07-21 PROCEDURE — 80061 LIPID PANEL: CPT

## 2022-07-21 PROCEDURE — 36415 COLL VENOUS BLD VENIPUNCTURE: CPT

## 2022-07-21 PROCEDURE — 83721 ASSAY OF BLOOD LIPOPROTEIN: CPT

## 2022-08-01 ENCOUNTER — HOSPITAL ENCOUNTER (OUTPATIENT)
Dept: HOSPITAL 76 - DI.WOS | Age: 57
Discharge: HOME | End: 2022-08-01
Attending: PHYSICIAN ASSISTANT
Payer: COMMERCIAL

## 2022-08-01 DIAGNOSIS — M23.41: Primary | ICD-10-CM

## 2022-08-01 DIAGNOSIS — M17.11: ICD-10-CM

## 2022-08-01 NOTE — XRAY REPORT
PROCEDURE:  Knee 3 View RT

 

INDICATIONS:  RIGHT KNEE PAIN

 

TECHNIQUE:  3 views of the right knee(s) were acquired.  

 

COMPARISON:  None.

 

FINDINGS:  

 

Bones:  No fractures or dislocations.  No suspicious bony lesions. Moderate right knee tricompartment
al osteoarthritis. 

 

Soft tissues:  No joint effusion. Large 4.9 x 1.7 x 3.6 cm ossified intra-articular loose body noted 
in the suprapatellar right knee joint.

 

 

IMPRESSION:  

 

Large ossified intra-articular loose body. 

 

Moderate tricompartmental osteoarthritis.

 

Reviewed by: Virginia Braswell MD, PhD on 8/1/2022 4:44 PM PDT

Approved by: Virginia Braswell MD, PhD on 8/1/2022 4:44 PM PDT

 

 

Station ID:  [id number]

## 2023-02-10 ENCOUNTER — HOSPITAL ENCOUNTER (OUTPATIENT)
Dept: HOSPITAL 76 - DI | Age: 58
Discharge: HOME | End: 2023-02-10
Attending: NURSE PRACTITIONER
Payer: COMMERCIAL

## 2023-02-10 DIAGNOSIS — Z12.31: Primary | ICD-10-CM

## 2023-02-14 NOTE — MAMMOGRAPHY REPORT
BILATERAL DIGITAL SCREENING MAMMOGRAM 3D/2D: 2/10/2023

 

CLINICAL: Routine screening.  

 

Comparison is made to exams dated:  1/20/2022 mammogram, 8/29/2017 mammogram, and 8/18/2014 mammogram
 - [facility].  

 

There are scattered areas of fibroglandular density in both breasts (category b / 25%-50% glandular t
issue).  

 

There is an asymmetry in the right breast anterior depth central to the nipple seen on the mediolater
al oblique view only.  

No other significant masses, calcifications, or other findings are seen in either breast.  

 

IMPRESSION: INCOMPLETE: NEEDS ADDITIONAL IMAGING EVALUATION

The asymmetry in the right breast is indeterminate.  Additional views with possible ultrasound are re
commended.  

 

 

 

This exam was interpreted at Station ID: [id number].  

 

NOTE: For mammograms, a report in lay terms will be sent to the patient. Approximately 15% of breast 
malignancies will not be visualized mammographically. In the management of a palpable breast mass, a 
negative mammogram must not discourage biopsy of a clinically suspicious lesion.

 

Electronically Signed By: Jossie ravi/fide:2/13/2023 17:42:22  

 

 

 

ACR BI-RADS Category 0: Incomplete 3340F

PARENCHYMAL PATTERN: (A) - The breast(s) demonstrate(s) scattered fibroglandular densities.

BI-RADS CATEGORY: (0) - 0

Mammo and US

[id number]

Immediate follow-up

LATERALITY: (B)

## 2023-03-08 ENCOUNTER — HOSPITAL ENCOUNTER (OUTPATIENT)
Dept: HOSPITAL 76 - DI | Age: 58
Discharge: HOME | End: 2023-03-08
Attending: NURSE PRACTITIONER
Payer: COMMERCIAL

## 2023-03-08 DIAGNOSIS — R92.8: Primary | ICD-10-CM

## 2023-03-10 NOTE — ULTRASOUND REPORT
LIMITED ULTRASOUND OF RIGHT BREAST: 3/8/2023

CLINICAL: Patient returns today to evaluate a focal asymmetry in the right breast.  

 

Comparison is made to exams dated:  3/8/2023 mammogram, 2/10/2023 mammogram, 2/23/2022 ultrasound, an
d 1/20/2022 mammogram - [facility].  

 

Color flow and real-time ultrasound of the right breast retroareolar were performed.  Gray scale imag
es of the real-time examination were reviewed.  

 

There is a benign dilated duct in the right breast central to the nipple in the retroareolar region. 
 Color flow imaging demonstrates that there is no vascularity present. 

 

 

IMPRESSION: BENIGN 

There is no sonographic evidence of malignancy.  

 

The dilated duct in the right breast is benign.  

 

A 1 year screening mammogram is recommended.   

 

Exam findings were conveyed to the patient. 

 

This exam was interpreted at Station ID: [id number].  

Electronically Signed By: Rasheed Palacios M.D.  

slc/:3/8/2023 13:46:49  

 

 

 

Ultrasound BI-RADS: 2 Benign

BI-RADS CATEGORY: (2) - 2

Mammogram

[id number]

1 year screening

LATERALITY: (B)

## 2023-03-10 NOTE — MAMMOGRAPHY REPORT
UNILATERAL RIGHT DIGITAL DIAGNOSTIC MAMMOGRAM 3D/2D: 3/8/2023

CLINICAL: Patient returns today to evaluate an asymmetry in the right breast.  

 

Comparison is made to exams dated:  2/10/2023 mammogram and 1/20/2022 mammogram - [facility].  

 

There are scattered areas of fibroglandular density in the right breast (category b / 25%-50% glandul
ar tissue).  

 

There is an asymmetry in the right breast anterior depth central to the nipple seen on the mediolater
al oblique view only.  This is not seen in additional views.  

 

No other significant masses or calcifications are seen in the breast.  

 

IMPRESSION: INCOMPLETE: NEEDS ADDITIONAL IMAGING EVALUATION

The asymmetry in the right breast is indeterminate.  

 

A targeted ultrasound is recommended and will immediately follow. 

 

 

This exam was interpreted at Station ID: [id number].  

 

NOTE: For mammograms, a report in lay terms will be sent to the patient. Approximately 15% of breast 
malignancies will not be visualized mammographically. In the management of a palpable breast mass, a 
negative mammogram must not discourage biopsy of a clinically suspicious lesion.

 

Electronically Signed By: Rasheed Palacios M.D.          

slc/:3/8/2023 13:45:42  

 

 

 

ACR BI-RADS Category 0: Incomplete 3340F

PARENCHYMAL PATTERN: (A) - The breast(s) demonstrate(s) scattered fibroglandular densities.

BI-RADS CATEGORY: (0) - 0

Ultrasound

[id number]

Immediate follow-up

LATERALITY: (B)

## 2023-03-13 ENCOUNTER — HOSPITAL ENCOUNTER (OUTPATIENT)
Dept: HOSPITAL 76 - LAB | Age: 58
Discharge: HOME | End: 2023-03-13
Attending: NURSE PRACTITIONER
Payer: COMMERCIAL

## 2023-03-13 DIAGNOSIS — I10: Primary | ICD-10-CM

## 2023-03-13 DIAGNOSIS — R73.9: ICD-10-CM

## 2023-03-13 LAB
ALBUMIN DIAFP-MCNC: 4.5 G/DL (ref 3.2–5.5)
ALBUMIN/GLOB SERPL: 1.4 {RATIO} (ref 1–2.2)
ALP SERPL-CCNC: 50 IU/L (ref 42–121)
ALT SERPL W P-5'-P-CCNC: 32 IU/L (ref 10–60)
ANION GAP SERPL CALCULATED.4IONS-SCNC: 7 MMOL/L (ref 6–13)
AST SERPL W P-5'-P-CCNC: 24 IU/L (ref 10–42)
BASOPHILS NFR BLD AUTO: 0.1 10^3/UL (ref 0–0.1)
BASOPHILS NFR BLD AUTO: 0.6 %
BILIRUB BLD-MCNC: 1 MG/DL (ref 0.2–1)
BUN SERPL-MCNC: 28 MG/DL (ref 6–20)
CALCIUM UR-MCNC: 9.5 MG/DL (ref 8.5–10.3)
CHLORIDE SERPL-SCNC: 106 MMOL/L (ref 101–111)
CO2 SERPL-SCNC: 26 MMOL/L (ref 21–32)
CREAT SERPLBLD-SCNC: 1.2 MG/DL (ref 0.4–1)
EOSINOPHIL # BLD AUTO: 0.6 10^3/UL (ref 0–0.7)
EOSINOPHIL NFR BLD AUTO: 6.5 %
ERYTHROCYTE [DISTWIDTH] IN BLOOD BY AUTOMATED COUNT: 14.9 % (ref 12–15)
EST. AVERAGE GLUCOSE BLD GHB EST-MCNC: 117 MG/DL (ref 70–100)
GFRSERPLBLD MDRD-ARVRAT: 46 ML/MIN/{1.73_M2} (ref 89–?)
GLOBULIN SER-MCNC: 3.2 G/DL (ref 2.1–4.2)
GLUCOSE SERPL-MCNC: 110 MG/DL (ref 70–100)
HBA1C MFR BLD HPLC: 5.7 % (ref 4.27–6.07)
HCT VFR BLD AUTO: 45.1 % (ref 37–47)
HGB UR QL STRIP: 14.2 G/DL (ref 12–16)
LYMPHOCYTES # SPEC AUTO: 2.5 10^3/UL (ref 1.5–3.5)
LYMPHOCYTES NFR BLD AUTO: 27.8 %
MCH RBC QN AUTO: 28.6 PG (ref 27–31)
MCHC RBC AUTO-ENTMCNC: 31.5 G/DL (ref 32–36)
MCV RBC AUTO: 90.7 FL (ref 81–99)
MONOCYTES # BLD AUTO: 0.9 10^3/UL (ref 0–1)
MONOCYTES NFR BLD AUTO: 10.2 %
NEUTROPHILS # BLD AUTO: 4.9 10^3/UL (ref 1.5–6.6)
NEUTROPHILS # SNV AUTO: 9 X10^3/UL (ref 4.8–10.8)
NEUTROPHILS NFR BLD AUTO: 54.7 %
NRBC # BLD AUTO: 0 /100WBC
NRBC # BLD AUTO: 0 X10^3/UL
PDW BLD AUTO: 10.8 FL (ref 7.9–10.8)
PLATELET # BLD: 292 10^3/UL (ref 130–450)
POTASSIUM SERPL-SCNC: 4.3 MMOL/L (ref 3.5–5)
PROT SPEC-MCNC: 7.7 G/DL (ref 6.7–8.2)
RBC MAR: 4.97 10^6/UL (ref 4.2–5.4)
SODIUM SERPLBLD-SCNC: 139 MMOL/L (ref 135–145)
TSH SERPL-ACNC: 1.45 UIU/ML (ref 0.34–5.6)

## 2023-03-13 PROCEDURE — 83036 HEMOGLOBIN GLYCOSYLATED A1C: CPT

## 2023-03-13 PROCEDURE — 36415 COLL VENOUS BLD VENIPUNCTURE: CPT

## 2023-03-13 PROCEDURE — 85025 COMPLETE CBC W/AUTO DIFF WBC: CPT

## 2023-03-13 PROCEDURE — 80053 COMPREHEN METABOLIC PANEL: CPT

## 2023-03-13 PROCEDURE — 84443 ASSAY THYROID STIM HORMONE: CPT

## 2023-06-06 ENCOUNTER — HOSPITAL ENCOUNTER (OUTPATIENT)
Dept: HOSPITAL 76 - SC | Age: 58
Discharge: HOME | End: 2023-06-06
Attending: NURSE PRACTITIONER
Payer: COMMERCIAL

## 2023-06-06 VITALS — DIASTOLIC BLOOD PRESSURE: 64 MMHG | SYSTOLIC BLOOD PRESSURE: 108 MMHG

## 2023-06-06 DIAGNOSIS — G47.8: ICD-10-CM

## 2023-06-06 DIAGNOSIS — R06.83: Primary | ICD-10-CM

## 2023-06-06 DIAGNOSIS — E66.9: ICD-10-CM

## 2023-06-06 DIAGNOSIS — I48.91: ICD-10-CM

## 2023-06-06 DIAGNOSIS — I10: ICD-10-CM

## 2023-06-06 DIAGNOSIS — I25.10: ICD-10-CM

## 2023-06-06 DIAGNOSIS — R53.83: ICD-10-CM

## 2023-06-06 PROCEDURE — 99212 OFFICE O/P EST SF 10 MIN: CPT

## 2023-06-06 PROCEDURE — 99203 OFFICE O/P NEW LOW 30 MIN: CPT

## 2023-06-06 NOTE — SLEEP CARE CONSULTATION
Information from patient questionnaire entered by Tenzin Valentine.





I have reviewed and concur with the information entered by Tenzin Valentine. This 

document represents the service I personally performed and the decisions made by

me, Osiris York ARNP.





History of Present Illness


Service Date and Time: 06/06/2023    1453


Reason for Visit: New patient


Chief Complaint: reports: Snoring, Frequent awakenings at night


Date of Onset: 3YRS


Usual bedtime: 9PM


Time it takes to fall asleep: 1HR because she is reading


Snores at night: Yes


Observed to quit breathing while asleep: No


Sleeps alone due to snoring: No


Number of times waking at night: 1-2


Reasons for waking at night: reports: Gasping for air (only when waking up with 

bad dream), Bathroom, Other (UNKNOWN).  denies: Choking


Toss, Turn, or Twitch while sleeping: Yes


Recalls having dreams: Yes


Usually gets out of bed at: 0530 wake up; out of bed 0630


Feels refreshed in the morning: Yes (for the most part)


Morning headache: No (may occasionally, not sure)


Sleepy or fatigued during the day: Yes (by the end of the day)


Ever fallen asleep while driving: No


Takes day naps: No


Prior sleep studies: No


Additional HPI information: 





I had the pleasure of seeing YANCI MCDANIELS today regarding the possibility of her

having a sleep disorder. Her current complaints are snoring and frequent night 

awakenings. She states she snores and wanted to check if she had sleep apnea. 

She had a heart attack in 2021 and decided she needed to be checked. She states 

she wakes up a couple of times a night but is able to go back to sleep. She 

wakes up to unknown reasons and sometimes from a dream. Sometimes she will wake 

up gasping for air but it is normally when she is experiencing a nightmare. She 

states she normally wakes up feeling rested. She occasionally wakes up with hea

daches. 





- Parasomnia Symptoms


Ever been unable to move upon waking from sleep: No


Walks in sleep: No


Talks in sleep: No


Ever acted out dreams in sleep: No


Ever felt weak in the knees when startled or emotional: No


Bothered by creepy, crawly, restless sensations in legs: No (twitches if "super 

tired")


Problems with memory or concentration: No





Subjective


Initial Lucama Sleepiness Scale score: 7 (06/06/23)





Past Medical History


Past Medical History: reports: Hypertension, Coronary Heart Disease (heart 

attack in 2021), Arrythmia (atrial fibrillation after the heart attack)





Social History


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 Patient is  and lives in 

[place]. 





Have you smoked in the past 12 months: No


Alcohol use: No


Caffeine use: Yes


Caffeine amount and frequency: 16OZ DAILY





Family History


Family history of sleep disordered breathing: No





Allergies and Home Medications


Known drug allergies: No


Drug allergies reviewed: Yes


Home medication list reviewed: Yes (see updated list in EMR)


Allergy and home medication list: 


Allergies





No Known Drug Allergies Allergy (Verified 06/05/23 10:17)


   











Review of Systems


Weight loss over past 5 years: 50 


Cardiovascular: reports: high blood pressure


Respiratory: reports: chronic cough


Gastrointestinal: denies: heartburn


Neurological: denies: headaches


Psychiatric: denies: anxiety, depression


Ear/Nose/Throat: reports: sinus problems, tonsillectomy, wisdom teeth removed


Endocrine: denies: thyroid disease


Musculoskeletal: reports: joint pain, muscle pain or cramping


Immunologic: reports: sneezing, allergies to food or environment (sour dough)





Physical Exam


Vital signs obtained and entered by: TENZIN PENDLETON MA


Blood Pressure: 108/64 (LEFT ARM)


Cuff size: long


Heart Rate: 52


O2 Saturation: 97


Height: 5 ft 3 in


Weight: 213 lb 9.6 oz


Body Mass Index: 37.8


BMI Classification: Obese


Neck circumference: 15


Mouth and throat: narrow oropharynx


Soft palate: long


Hard palate: normal


Uvula: normal


Uvula visualization: 25% Mallampati Class III


Tongue: normal in size


Tonsils: absent bilaterally


Neck: normal w/o lymphadenopathy or thyromegaly


Heart: regular rate and rhythm


Lungs: clear bilaterally





Impression and Plan





1. Suspected Obstructive Sleep Apnea-Hypopnea Syndrome, as suggested by a 

history of loud and irregular snoring, frequent awakening during the night, and 

cardiac disease. Narrow oropharynx and obesity are common predisposing factors 

for obstructive sleep apnea-hypopnea syndrome. I recommend proceeding to 

polysomnography to confirm the diagnosis and to assess severity. If the patient 

has significant sleep disordered breathing, a manual CPAP titration study will a

lso be performed to find the optimal treatment pressure. I informed the patient 

of what the sleep studies involve and after some discussion, obtained agreement 

to proceed. The pathophysiology of obstructive sleep apnea-hypopnea syndrome was

discussed with the patient and health risks of cardiovascular and 

cerebrovascular disease if not treated.  Risks of drowsy driving discussed in 

detail and patient advised to avoid long distance driving and to pull over at 

the first sign of drowsiness. Patient agreed to plan. 





* Schedule polysomnography +- manual CPAP titration study and return in 1-2 

  weeks after the study to discuss result and initiate therapy.


* Avoid long distance driving or driving when feeling sleepy.


* Avoid alcohol, sedative and muscle relaxant around bedtime.


* Attempt to lose weight.


* Review instructions provided by trained office staff on how to prepare for the

  sleep study.


* Return for follow-up after sleep study completed.








Counseling Topics: Weight loss health impact


Visit Type: In Office


Time Spent with Patient (minutes): 30


Provider Statement: I spent 100% of the Face to Face Visit with the patient with

greater than 50% spent counseling the patient and coordination of care.

## 2023-06-06 NOTE — SLEEP PATIENT INSTRUCTIONS
Sleep Center Visit Summary





- Patient Visit Information


Reason for Visit: 





Initial consult for evaluation of sleep disordered breathing and other sleep 

issues.





- Patient Instructions


Instructions Attached:  Sleep Study, Sleep Clinic Visit, Sleep Study Home 

Monitor


Additional Instructions: 





You will be completing a sleep study, either an in-lab polysomnography (PSG) or 

home sleep study (HST).  You will follow-up in the sleep care office after the 

sleep study is completed to hear the results and talk about therapy, if needed. 







You will be called by our office staff to schedule this appointment, but you may

contact us with any questions.








- Clinic Information


Contact: 





[facility]


[address] [place] [address]


[email or web address]


T: [phone]

## 2023-07-17 ENCOUNTER — HOSPITAL ENCOUNTER (OUTPATIENT)
Dept: HOSPITAL 76 - DI.WOS | Age: 58
Discharge: HOME | End: 2023-07-17
Attending: ORTHOPAEDIC SURGERY
Payer: COMMERCIAL

## 2023-07-17 DIAGNOSIS — M23.41: ICD-10-CM

## 2023-07-17 DIAGNOSIS — M17.0: Primary | ICD-10-CM

## 2023-07-17 NOTE — XRAY REPORT
PROCEDURE:  Knee View RT

 

INDICATIONS:  RIGHT KNEE PAIN

 

TECHNIQUE:  4 views of the right knee(s) were acquired.  

 

COMPARISON:  08/01/2022.

 

FINDINGS:  

 

Bones:  No acute fracture dislocation. Mild interval progression of moderate tricompartmental osteoar
throsis of the right knee. Redemonstration large corticated intra-articular loose body within the sup
rapatellar] knee measuring 4.9 x 1.3 cm in size. Prominent marginal osteophytes. Mild-moderate right 
and severe left medial femorotibial compartment joint space narrowing.

 

Soft tissues:  No knee joint effusion. No suspicious soft tissue calcifications or masses.  

 

 

IMPRESSION:  

Right knee without acute fracture dislocation.

 

Mild interval progression of moderate tricompartmental degenerative changes of the right knee.

 

Stable appearance of large intra-articular loose body.

 

 

 

Reviewed by: Pierre Lange MD on 7/17/2023 1:01 PM LYNNE

Approved by: iPerre Lange MD on 7/17/2023 1:01 PM LYNNE

 

 

Station ID:  [id number]

## 2023-09-05 ENCOUNTER — HOSPITAL ENCOUNTER (OUTPATIENT)
Dept: HOSPITAL 76 - DI.WOS | Age: 58
Discharge: HOME | End: 2023-09-05
Attending: ORTHOPAEDIC SURGERY
Payer: COMMERCIAL

## 2023-09-05 DIAGNOSIS — M17.12: ICD-10-CM

## 2023-09-05 DIAGNOSIS — Z96.651: Primary | ICD-10-CM

## 2023-09-05 NOTE — XRAY REPORT
PROCEDURE:  Knee 4 View RT

 

INDICATIONS:  RIGHT TOTAL KNEE

 

TECHNIQUE:  4 views of the right knee(s) were acquired.  

 

COMPARISON:  July 19, 2023

 

FINDINGS:  

 

Bones:  Postsurgical changes of right knee arthroplasty. Moderate to severe left knee degenerative ch
anges, particularly the medial compartment, again seen. 

 

Soft tissues: Scattered periarticular small ossification sites.

 

 

IMPRESSION:  

Postsurgical changes of right knee arthroplasty.

 

Partially visualized left knee moderate to severe degenerative changes of the medial compartment.

 

 

 

Reviewed by: Renny Robert MD on 9/5/2023 5:33 PM PDT

Approved by: Renny Robert MD on 9/5/2023 5:33 PM PDT

 

 

Station ID:  [id number]

## 2023-09-12 ENCOUNTER — HOSPITAL ENCOUNTER (OUTPATIENT)
Dept: HOSPITAL 76 - SC | Age: 58
Discharge: HOME | End: 2023-09-12
Attending: NURSE PRACTITIONER
Payer: COMMERCIAL

## 2023-09-12 DIAGNOSIS — R09.02: ICD-10-CM

## 2023-09-12 DIAGNOSIS — G47.33: Primary | ICD-10-CM

## 2023-09-12 DIAGNOSIS — E66.9: ICD-10-CM

## 2023-09-12 PROCEDURE — 95806 SLEEP STUDY UNATT&RESP EFFT: CPT

## 2024-02-19 ENCOUNTER — HOSPITAL ENCOUNTER (OUTPATIENT)
Dept: HOSPITAL 76 - LAB | Age: 59
Discharge: HOME | End: 2024-02-19
Attending: INTERNAL MEDICINE
Payer: COMMERCIAL

## 2024-02-19 DIAGNOSIS — I48.0: Primary | ICD-10-CM

## 2024-02-19 DIAGNOSIS — I34.0: ICD-10-CM

## 2024-02-19 DIAGNOSIS — I50.43: ICD-10-CM

## 2024-02-19 LAB
ANION GAP SERPL CALCULATED.4IONS-SCNC: 6 MMOL/L (ref 6–13)
BASOPHILS NFR BLD AUTO: 0.1 10^3/UL (ref 0–0.1)
BASOPHILS NFR BLD AUTO: 0.7 %
BUN SERPL-MCNC: 18 MG/DL (ref 6–20)
CALCIUM UR-MCNC: 9.9 MG/DL (ref 8.5–10.3)
CHLORIDE SERPL-SCNC: 107 MMOL/L (ref 101–111)
CO2 SERPL-SCNC: 24 MMOL/L (ref 21–32)
CREAT SERPLBLD-SCNC: 1 MG/DL (ref 0.6–1.3)
EOSINOPHIL # BLD AUTO: 0.4 10^3/UL (ref 0–0.7)
EOSINOPHIL NFR BLD AUTO: 5.5 %
ERYTHROCYTE [DISTWIDTH] IN BLOOD BY AUTOMATED COUNT: 14.4 % (ref 12–15)
GFRSERPLBLD MDRD-ARVRAT: 57 ML/MIN/{1.73_M2} (ref 89–?)
GLUCOSE SERPL-MCNC: 103 MG/DL (ref 74–104)
HCT VFR BLD AUTO: 43.9 % (ref 37–47)
HGB UR QL STRIP: 13.9 G/DL (ref 12–16)
LYMPHOCYTES # SPEC AUTO: 2.1 10^3/UL (ref 1.5–3.5)
LYMPHOCYTES NFR BLD AUTO: 30 %
MCH RBC QN AUTO: 28.6 PG (ref 27–31)
MCHC RBC AUTO-ENTMCNC: 31.7 G/DL (ref 32–36)
MCV RBC AUTO: 90.3 FL (ref 81–99)
MONOCYTES # BLD AUTO: 0.5 10^3/UL (ref 0–1)
MONOCYTES NFR BLD AUTO: 7.4 %
NEUTROPHILS # BLD AUTO: 4 10^3/UL (ref 1.5–6.6)
NEUTROPHILS # SNV AUTO: 7 X10^3/UL (ref 4.8–10.8)
NEUTROPHILS NFR BLD AUTO: 56.1 %
NRBC # BLD AUTO: 0 /100WBC
NRBC # BLD AUTO: 0 X10^3/UL
PDW BLD AUTO: 10.5 FL (ref 7.9–10.8)
PLATELET # BLD: 294 10^3/UL (ref 130–450)
POTASSIUM SERPL-SCNC: 4.2 MMOL/L (ref 3.5–4.5)
RBC MAR: 4.86 10^6/UL (ref 4.2–5.4)
SODIUM SERPLBLD-SCNC: 137 MMOL/L (ref 135–145)
TSH SERPL-ACNC: 1.29 UIU/ML (ref 0.34–5.6)

## 2024-02-19 PROCEDURE — 85025 COMPLETE CBC W/AUTO DIFF WBC: CPT

## 2024-02-19 PROCEDURE — 36415 COLL VENOUS BLD VENIPUNCTURE: CPT

## 2024-02-19 PROCEDURE — 84443 ASSAY THYROID STIM HORMONE: CPT

## 2024-02-19 PROCEDURE — 80048 BASIC METABOLIC PNL TOTAL CA: CPT

## 2024-07-19 ENCOUNTER — HOSPITAL ENCOUNTER (OUTPATIENT)
Dept: HOSPITAL 76 - DI | Age: 59
Discharge: HOME | End: 2024-07-19
Attending: ORTHOPAEDIC SURGERY
Payer: COMMERCIAL

## 2024-07-19 DIAGNOSIS — M25.561: Primary | ICD-10-CM

## 2024-07-19 DIAGNOSIS — M17.12: ICD-10-CM

## 2024-07-19 DIAGNOSIS — Z96.651: ICD-10-CM

## 2024-07-19 NOTE — XRAY REPORT
PROCEDURE:  Knee 4+V RT

 

INDICATIONS:  RIGHT KNEE PAIN

 

TECHNIQUE:  4 views of the knee were acquired.  

 

COMPARISON:  Right knee radiographs 9/5/2023 and 7/19/2023

 

FINDINGS:  

 

Bones:  Postsurgical changes again seen from total knee arthroplasty. Hardware components are in stab
le positions without radiographic signs of loosening. No acute osseous fracture. Included left medial
 frontal view demonstrates moderate to severe medial femorotibial osteoarthrosis.

 

Soft tissues:  Possible small knee joint effusion. No suspicious soft tissue calcifications.  

 

 

IMPRESSION:  

1.Postsurgical changes from total knee arthroplasty with stable radiographic appearance. 

2.Moderate to severe left medial femorotibial compartment osteoarthrosis again noted.

 

 

 

Reviewed by: Manoj Dumont MD on 7/19/2024 2:16 PM PDT

Approved by: Manoj Dumont MD on 7/19/2024 2:16 PM PDT

 

 

Station ID:  [id number]